# Patient Record
Sex: FEMALE | Race: WHITE | NOT HISPANIC OR LATINO | Employment: FULL TIME | URBAN - METROPOLITAN AREA
[De-identification: names, ages, dates, MRNs, and addresses within clinical notes are randomized per-mention and may not be internally consistent; named-entity substitution may affect disease eponyms.]

---

## 2017-06-08 ENCOUNTER — LAB REQUISITION (OUTPATIENT)
Dept: LAB | Facility: HOSPITAL | Age: 52
End: 2017-06-08
Payer: COMMERCIAL

## 2017-06-08 ENCOUNTER — ALLSCRIPTS OFFICE VISIT (OUTPATIENT)
Dept: OTHER | Facility: OTHER | Age: 52
End: 2017-06-08

## 2017-06-08 DIAGNOSIS — Z11.51 ENCOUNTER FOR SCREENING FOR HUMAN PAPILLOMAVIRUS (HPV): ICD-10-CM

## 2017-06-08 DIAGNOSIS — Z12.31 ENCOUNTER FOR SCREENING MAMMOGRAM FOR MALIGNANT NEOPLASM OF BREAST: ICD-10-CM

## 2017-06-08 DIAGNOSIS — Z12.4 ENCOUNTER FOR SCREENING FOR MALIGNANT NEOPLASM OF CERVIX: ICD-10-CM

## 2017-06-08 PROCEDURE — G0145 SCR C/V CYTO,THINLAYER,RESCR: HCPCS | Performed by: OBSTETRICS & GYNECOLOGY

## 2017-06-08 PROCEDURE — 87624 HPV HI-RISK TYP POOLED RSLT: CPT | Performed by: OBSTETRICS & GYNECOLOGY

## 2017-06-12 LAB — HPV RRNA GENITAL QL NAA+PROBE: NORMAL

## 2017-06-14 LAB
LAB AP GYN PRIMARY INTERPRETATION: NORMAL
Lab: NORMAL

## 2017-11-01 ENCOUNTER — HOSPITAL ENCOUNTER (OUTPATIENT)
Dept: RADIOLOGY | Age: 52
Discharge: HOME | End: 2017-11-01
Payer: COMMERCIAL

## 2017-11-01 DIAGNOSIS — Z12.31 ENCOUNTER FOR SCREENING MAMMOGRAM FOR MALIGNANT NEOPLASM OF BREAST: ICD-10-CM

## 2017-11-01 PROCEDURE — 77063 BREAST TOMOSYNTHESIS BI: CPT

## 2017-11-01 PROCEDURE — G0202 SCR MAMMO BI INCL CAD: HCPCS

## 2017-11-02 ENCOUNTER — GENERIC CONVERSION - ENCOUNTER (OUTPATIENT)
Dept: OTHER | Facility: OTHER | Age: 52
End: 2017-11-02

## 2017-11-02 DIAGNOSIS — R92.2 INCONCLUSIVE MAMMOGRAM: ICD-10-CM

## 2017-11-22 ENCOUNTER — HOSPITAL ENCOUNTER (OUTPATIENT)
Dept: ULTRASOUND IMAGING | Facility: CLINIC | Age: 52
Discharge: HOME | End: 2017-11-22
Payer: COMMERCIAL

## 2017-11-22 DIAGNOSIS — R92.2 INCONCLUSIVE MAMMOGRAM: ICD-10-CM

## 2017-11-22 PROCEDURE — 76641 ULTRASOUND BREAST COMPLETE: CPT

## 2017-11-22 PROCEDURE — 76377 3D RENDER W/INTRP POSTPROCES: CPT

## 2018-01-12 NOTE — MISCELLANEOUS
Message   Recorded as Task   Date: 03/31/2016 02:44 PM, Created By: Yvette Nam   Task Name: Go to Result   Assigned To: Anatoliy Brewer   Regarding Patient: Jayesh Andres, Status: Active   CommentDeneen Mace - 31 Mar 2016 2:44 PM     TASK CREATED  Mammogram is normal but dense  Could consider automated breast ultrasound now or 3-D mammogram next year  Meseret Sherman - 31 Mar 2016 2:51 PM     TASK REPLIED TO: Previously Assigned To [facility]  letter and script sent to pt        Active Problems    1  Breast self examination education, encounter for (V65 49) (Z71 89)   2  Dense breasts (793 82) (R92 2)   3  Encounter for cervical Pap smear with pelvic exam (V76 2,V72 31) (Z01 419)   4  Encounter for routine gynecological examination (V72 31) (Z01 419)   5  Encounter for screening mammogram for malignant neoplasm of breast (V76 12)   (Z12 31)   6  Menopause (627 2)   7  Symptomatic menopausal or female climacteric states (627 2) (N95 1)    Current Meds   1  Climara 0 05 MG/24HR Transdermal Patch Weekly (Estradiol); APPLY 1 PATCH   WEEKLY AS DIRECTED; Therapy: 02Apr2012 to (Evaluate:31Oct2016)  Requested for: [id number]; Last   Rx:30Nov2015 Ordered   2  Levothyroxine Sodium 25 MCG Oral Tablet; Therapy: (Recorded:30Nov2015) to Recorded    Allergies    1  No Known Drug Allergies    Plan  Dense breasts    · US BREAST BILATERAL ABUS; Status:Hold For - Scheduling,Retrospective  Authorization;  Requested for:31Mar2016;     Signatures   Electronically signed by : Yadira Raymond, ; Mar 31 2016  2:52PM EST                       (Author)

## 2018-01-13 VITALS
HEIGHT: 61 IN | BODY MASS INDEX: 23.22 KG/M2 | WEIGHT: 123 LBS | DIASTOLIC BLOOD PRESSURE: 64 MMHG | SYSTOLIC BLOOD PRESSURE: 108 MMHG

## 2018-01-17 NOTE — MISCELLANEOUS
Message   Recorded as Task   Date: 11/02/2017 10:19 AM, Created By: Mone Tripp   Task Name: Miscellaneous   Assigned To: Marconstanza Pérez   Regarding Patient: Mathew Forman, Status: Active   CommentConi Earl - 02 Nov 2017 10:19 AM     TASK CREATED  Mammogram within normal limits with increased breast density noted  Could consider automated breast ultrasound soon and/or 3D mammogram in 1 year's time  Meseret Sherman - 16 Nov 2017 1:33 PM     TASK EDITED  letter and script mailed to pt        Active Problems    1  Breast self examination education, encounter for (V65 49) (Z71 89)   2  Cervical cancer screening (V76 2) (Z12 4)   3  Dense breasts (793 82) (R92 2)   4  Encounter for routine gynecological examination with Papanicolaou smear of cervix   (V72 31,V76 2) (Z01 419)   5  Encounter for screening mammogram for malignant neoplasm of breast (V76 12)   (Z12 31)   6  Menopause (627 2)   7  Screening for HPV (human papillomavirus) (V73 81) (Z11 51)   8  Symptomatic menopausal or female climacteric states (627 2) (N95 1)    Current Meds   1  Climara 0 05 MG/24HR Transdermal Patch Weekly (Estradiol); APPLY 1 PATCH   WEEKLY AS DIRECTED; Therapy: 02Apr2012 to (Evaluate:[id number])  Requested for: [id number]; Last   Rx:08Jun2017 Ordered   2  Levothyroxine Sodium 25 MCG Oral Tablet; Therapy: (Recorded:30Nov2015) to Recorded    Allergies    1  No Known Drug Allergies    Plan  Dense breasts    · US BREAST SCREENING BILATERAL COMPLETE (ABUS); Status:Hold For -  [facility];  Requested KMU:[id number];     Signatures   Electronically signed by : Mala Mancilla, ; Nov 2 2017  1:33PM EST                       (Author)

## 2018-06-20 ENCOUNTER — ANNUAL EXAM (OUTPATIENT)
Dept: OBGYN CLINIC | Facility: CLINIC | Age: 53
End: 2018-06-20
Payer: COMMERCIAL

## 2018-06-20 VITALS
BODY MASS INDEX: 23.9 KG/M2 | WEIGHT: 126.6 LBS | DIASTOLIC BLOOD PRESSURE: 60 MMHG | SYSTOLIC BLOOD PRESSURE: 112 MMHG | HEIGHT: 61 IN

## 2018-06-20 DIAGNOSIS — N95.1 SYMPTOMATIC MENOPAUSAL OR FEMALE CLIMACTERIC STATES: ICD-10-CM

## 2018-06-20 DIAGNOSIS — R92.2 DENSE BREASTS: ICD-10-CM

## 2018-06-20 DIAGNOSIS — Z01.419 WOMEN'S ANNUAL ROUTINE GYNECOLOGICAL EXAMINATION: ICD-10-CM

## 2018-06-20 DIAGNOSIS — Z12.4 SCREENING FOR CERVICAL CANCER: Primary | ICD-10-CM

## 2018-06-20 DIAGNOSIS — Z12.31 VISIT FOR SCREENING MAMMOGRAM: ICD-10-CM

## 2018-06-20 PROCEDURE — G0145 SCR C/V CYTO,THINLAYER,RESCR: HCPCS | Performed by: OBSTETRICS & GYNECOLOGY

## 2018-06-20 PROCEDURE — 99396 PREV VISIT EST AGE 40-64: CPT | Performed by: OBSTETRICS & GYNECOLOGY

## 2018-06-20 RX ORDER — FLUTICASONE PROPIONATE 50 MCG
2 SPRAY, SUSPENSION (ML) NASAL
COMMUNITY
Start: 2017-09-06 | End: 2021-08-12

## 2018-06-20 RX ORDER — LEVOTHYROXINE SODIUM 0.03 MG/1
TABLET ORAL
COMMUNITY

## 2018-06-20 RX ORDER — ALBUTEROL SULFATE 90 UG/1
1 AEROSOL, METERED RESPIRATORY (INHALATION) EVERY 6 HOURS
COMMUNITY
Start: 2017-10-06 | End: 2018-10-06

## 2018-06-20 RX ORDER — ESTRADIOL 0.05 MG/D
1 PATCH TRANSDERMAL WEEKLY
COMMUNITY
Start: 2012-04-02 | End: 2018-06-20 | Stop reason: SDUPTHER

## 2018-06-20 RX ORDER — ESTRADIOL 0.05 MG/D
1 PATCH TRANSDERMAL WEEKLY
Qty: 12 PATCH | Refills: 3 | Status: SHIPPED | OUTPATIENT
Start: 2018-06-20 | End: 2020-05-11 | Stop reason: SDUPTHER

## 2018-06-20 NOTE — PATIENT INSTRUCTIONS
Hormone Replacement Therapy in Women   WHAT YOU NEED TO KNOW:   What is hormone replacement therapy? Hormone replacement therapy (HRT) is medicine to replace your low hormone levels  HRT is most common for women who are going through menopause  HRT contains estrogen and sometimes progestin  What are hormones and how do they work? Hormones are chemicals that your body makes to control certain body functions  The main female hormones are estrogen and progesterone  These are made by your ovaries  These hormones are an important part of your reproductive system  What are the signs and symptoms of low hormone levels? · Hot flashes    · Sleep loss    · Depression, nervousness, or tiredness    · Trouble staying focused    · Vaginal dryness  Why might I have low hormone levels? · Menopause  is the most common reason for low hormone levels  The average age when a woman's monthly period stops is 51 years  · Removal of your ovaries  Menopause symptoms start right away after ovaries are removed  This happens because there is no more estrogen in your body  · Excessive exercise or weight loss  can cause your estrogen level to decrease  Your monthly periods may also stop  Who should not take HRT? You should not take HRT if you have any of the following conditions:  · Breast cancer    · Cancer in the lining of your uterus or cancer of your ovaries    · Heart disease    · High blood pressure    · History of blood clots    · History of smoking, or current tobacco use    · Liver disease    · Unexplained vaginal bleeding  What else should I know about HRT? HRT helps prevent osteoporosis, which decreases your risk for bone fractures  HRT also protects you from colorectal cancer  HRT may increase your risk for breast cancer, blood clots, heart disease, and stroke  How often should I follow up with my healthcare provider? You will need to return to see your healthcare provider at least once a year   You may need tests, such a pap smear or mammogram  Your healthcare provider will ask how you are responding to HRT, and change your dose if needed  When should I contact my healthcare provider? · You have nausea or are vomiting  · You are depressed  · You have abdominal pain or cramping  · You have swollen or tender breasts  · You have more vaginal bleeding than expected  · You have sudden weight gain or loss  · You have questions or concerns about your condition or care  When should I seek immediate care or call 911? · You are confused  · Your arm or leg feels warm, tender, and painful  It may look swollen and red  · You feel lightheaded, short of breath, and have chest pain  · You cough up blood  · You have any of the following signs of a stroke:      ¨ Numbness or drooping on one side of your face     ¨ Weakness in an arm or leg    ¨ Confusion or difficulty speaking    ¨ Dizziness, a severe headache, or vision loss  CARE AGREEMENT:   You have the right to help plan your care  Learn about your health condition and how it may be treated  Discuss treatment options with your caregivers to decide what care you want to receive  You always have the right to refuse treatment  The above information is an  only  It is not intended as medical advice for individual conditions or treatments  Talk to your doctor, nurse or pharmacist before following any medical regimen to see if it is safe and effective for you  © 2017 [facility] Information is for End User's use only and may not be sold, redistributed or otherwise used for commercial purposes  All illustrations and images included in CareNotes® are the copyrighted property of [employer] M [employer]  or [employer]

## 2018-06-20 NOTE — PROGRESS NOTES
Assessment/Plan:  1  Yearly exam-Pap smear done with reflex HPV at patient request, self-breast awareness reviewed, calcium/vitamin-D recommendations discussed, mammogram request given, colonoscopy as per specialist   2   Symptomatic menopause-patient continues on Climara 0 05 mg patch  She has been on this for 14 years now  Instead of using at 7 day intervals, she uses it up to 30 day intervals in attempts to wean off the medication  She requested prescription and 3 month supply refill 3 was sent to mail order pharmacy at her request   She is aware of risks of hormone replacement therapy as discussed previously  3   Status post supracervical hysterectomy with bilateral salpingo-oophorectomy  4  Increased breast density-noted on most recent mammogram 11/1/17 and previously  ABUS was done 11/22/18 and was negative  Patient will continue with 3D mammogram and request was given postdated 11/1/18  She will follow-up in 1 year or as needed  No problem-specific Assessment & Plan notes found for this encounter  Diagnoses and all orders for this visit:    Screening for cervical cancer  -     Liquid-based pap, screening    Women's annual routine gynecological examination    Visit for screening mammogram  -     Mammo screening bilateral w 3d & cad; Future    Symptomatic menopausal or female climacteric states  -     estradiol (CLIMARA) 0 05 mg/24 hr; Place 1 patch on the skin once a week    Other orders  -     Discontinue: estradiol (CLIMARA) 0 05 mg/24 hr; Place 1 patch on the skin once a week  -     levothyroxine 25 mcg tablet; Take by mouth  -     albuterol (PROVENTIL HFA,VENTOLIN HFA) 90 mcg/act inhaler; Inhale 1 puff every 6 (six) hours  -     fluticasone (FLONASE) 50 mcg/act nasal spray; 2 sprays into each nostril          Subjective:      Patient ID: Leanne Greenberg is a 46 y o  female  Patient was seen today for yearly exam   Please see assessment plan for details          The following portions of the patient's history were reviewed and updated as appropriate: allergies, current medications, past family history, past medical history, past social history, past surgical history and problem list     Review of Systems   Constitutional: Negative for chills, diaphoresis, fatigue and fever  Respiratory: Negative for apnea, cough, chest tightness, shortness of breath and wheezing  Cardiovascular: Negative for chest pain, palpitations and leg swelling  Gastrointestinal: Negative for abdominal distention, abdominal pain, anal bleeding, constipation, diarrhea, nausea, rectal pain and vomiting  Genitourinary: Negative for difficulty urinating, dyspareunia, dysuria, frequency, hematuria, menstrual problem, pelvic pain, urgency, vaginal bleeding, vaginal discharge and vaginal pain  Musculoskeletal: Negative for arthralgias, back pain and myalgias  Skin: Negative for color change and rash  Neurological: Negative for dizziness, syncope, light-headedness, numbness and headaches  Hematological: Negative for adenopathy  Does not bruise/bleed easily  Psychiatric/Behavioral: Negative for dysphoric mood and sleep disturbance  The patient is not nervous/anxious  Occasional menopausal symptoms     Objective:      /60 (BP Location: Left arm, Patient Position: Sitting, Cuff Size: Standard)   Ht 5' 0 5" (1 537 m)   Wt 57 4 kg (126 lb 9 6 oz)   BMI 24 32 kg/m²          Physical Exam    Objective      /60 (BP Location: Left arm, Patient Position: Sitting, Cuff Size: Standard)   Ht 5' 0 5" (1 537 m)   Wt 57 4 kg (126 lb 9 6 oz)   BMI 24 32 kg/m²     General:   alert and oriented, in no acute distress, alert, appears stated age and cooperative   Neck: normal to inspection and palpation   Breast: normal appearance, no masses or tenderness   Heart:    Lungs:    Abdomen: soft, non-tender, without masses or organomegaly   Vulva: normal   Vagina: Without erythema or lesions or discharge   Very minimally atrophic   Cervix: Without lesions or discharge or cervicitis  No Cervical motion tenderness   and normal   Uterus: surgically absent   Adnexa: no mass, fullness, tenderness   Rectum: negative

## 2018-06-25 LAB
LAB AP GYN PRIMARY INTERPRETATION: NORMAL
Lab: NORMAL

## 2018-11-12 ENCOUNTER — HOSPITAL ENCOUNTER (OUTPATIENT)
Dept: RADIOLOGY | Age: 53
Discharge: HOME | End: 2018-11-12
Payer: COMMERCIAL

## 2018-11-12 VITALS — WEIGHT: 124 LBS | HEIGHT: 61 IN | BODY MASS INDEX: 23.41 KG/M2

## 2018-11-12 DIAGNOSIS — Z12.31 VISIT FOR SCREENING MAMMOGRAM: ICD-10-CM

## 2018-11-12 PROCEDURE — 77063 BREAST TOMOSYNTHESIS BI: CPT

## 2018-11-12 PROCEDURE — 77067 SCR MAMMO BI INCL CAD: CPT

## 2018-11-14 ENCOUNTER — TELEPHONE (OUTPATIENT)
Dept: OBGYN CLINIC | Facility: CLINIC | Age: 53
End: 2018-11-14

## 2018-11-14 DIAGNOSIS — R92.2 DENSE BREAST TISSUE: Primary | ICD-10-CM

## 2018-11-14 NOTE — TELEPHONE ENCOUNTER
----- Message from Lawanda Day MD sent at 11/13/2018 12:41 PM EST -----  3D Mammogram within normal limits with increased breast density noted  Could consider automated breast ultrasound soon and/or 3D mammogram in 1 year's time

## 2018-11-30 ENCOUNTER — TELEPHONE (OUTPATIENT)
Dept: OBGYN CLINIC | Facility: CLINIC | Age: 53
End: 2018-11-30

## 2018-11-30 ENCOUNTER — HOSPITAL ENCOUNTER (OUTPATIENT)
Dept: ULTRASOUND IMAGING | Facility: CLINIC | Age: 53
Discharge: HOME | End: 2018-11-30

## 2018-12-10 ENCOUNTER — HOSPITAL ENCOUNTER (OUTPATIENT)
Dept: ULTRASOUND IMAGING | Facility: CLINIC | Age: 53
Discharge: HOME | End: 2018-12-10
Payer: COMMERCIAL

## 2018-12-10 DIAGNOSIS — R92.2 DENSE BREAST TISSUE: ICD-10-CM

## 2018-12-10 PROCEDURE — 76641 ULTRASOUND BREAST COMPLETE: CPT

## 2018-12-10 PROCEDURE — 76377 3D RENDER W/INTRP POSTPROCES: CPT

## 2019-06-24 ENCOUNTER — ANNUAL EXAM (OUTPATIENT)
Dept: OBGYN CLINIC | Facility: CLINIC | Age: 54
End: 2019-06-24
Payer: COMMERCIAL

## 2019-06-24 VITALS
SYSTOLIC BLOOD PRESSURE: 108 MMHG | DIASTOLIC BLOOD PRESSURE: 62 MMHG | HEIGHT: 61 IN | BODY MASS INDEX: 24.05 KG/M2 | WEIGHT: 127.4 LBS

## 2019-06-24 DIAGNOSIS — Z13.820 OSTEOPOROSIS SCREENING: ICD-10-CM

## 2019-06-24 DIAGNOSIS — Z12.4 SCREENING FOR CERVICAL CANCER: Primary | ICD-10-CM

## 2019-06-24 DIAGNOSIS — Z12.31 ENCOUNTER FOR SCREENING MAMMOGRAM FOR MALIGNANT NEOPLASM OF BREAST: ICD-10-CM

## 2019-06-24 DIAGNOSIS — Z01.419 WOMEN'S ANNUAL ROUTINE GYNECOLOGICAL EXAMINATION: ICD-10-CM

## 2019-06-24 DIAGNOSIS — R92.2 DENSE BREASTS: ICD-10-CM

## 2019-06-24 PROCEDURE — 99396 PREV VISIT EST AGE 40-64: CPT | Performed by: OBSTETRICS & GYNECOLOGY

## 2019-06-24 PROCEDURE — G0145 SCR C/V CYTO,THINLAYER,RESCR: HCPCS | Performed by: OBSTETRICS & GYNECOLOGY

## 2019-06-26 LAB
LAB AP GYN PRIMARY INTERPRETATION: NORMAL
Lab: NORMAL

## 2019-07-08 ENCOUNTER — TELEPHONE (OUTPATIENT)
Dept: OBGYN CLINIC | Facility: CLINIC | Age: 54
End: 2019-07-08

## 2019-08-23 ENCOUNTER — HOSPITAL ENCOUNTER (OUTPATIENT)
Dept: RADIOLOGY | Age: 54
Discharge: HOME | End: 2019-08-23
Payer: COMMERCIAL

## 2019-08-23 VITALS — WEIGHT: 127 LBS | BODY MASS INDEX: 23.98 KG/M2 | HEIGHT: 61 IN

## 2019-08-23 DIAGNOSIS — Z12.31 ENCOUNTER FOR SCREENING MAMMOGRAM FOR MALIGNANT NEOPLASM OF BREAST: ICD-10-CM

## 2019-08-23 DIAGNOSIS — Z13.820 OSTEOPOROSIS SCREENING: ICD-10-CM

## 2019-08-23 PROBLEM — M85.80 OSTEOPENIA: Status: ACTIVE | Noted: 2019-08-23

## 2019-08-23 PROCEDURE — 77080 DXA BONE DENSITY AXIAL: CPT

## 2019-08-23 PROCEDURE — 77063 BREAST TOMOSYNTHESIS BI: CPT

## 2019-08-23 PROCEDURE — 77067 SCR MAMMO BI INCL CAD: CPT

## 2019-08-26 ENCOUNTER — TELEPHONE (OUTPATIENT)
Dept: OBGYN CLINIC | Facility: CLINIC | Age: 54
End: 2019-08-26

## 2019-08-26 DIAGNOSIS — R92.2 DENSE BREAST TISSUE: Primary | ICD-10-CM

## 2019-08-26 NOTE — TELEPHONE ENCOUNTER
----- Message from Hayde Valdez MD sent at 8/23/2019  4:14 PM EDT -----  DEXA scan is consistent with osteopenia, with FRAX risk below that recommended for medical treatment  Would recommend calcium, vitamin-D, and weight-bearing exercise  Would recommend repeat DEXA scan in about 2 years time  Please inform the patient    Thanks

## 2019-08-26 NOTE — TELEPHONE ENCOUNTER
----- Message from Eliane Ma MD sent at 8/23/2019  4:17 PM EDT -----  3D Mammogram within normal limits with increased breast density noted  Could consider automated breast ultrasound soon and/or 3D mammogram in 1 year's time

## 2019-10-01 ENCOUNTER — TELEPHONE (OUTPATIENT)
Dept: OBGYN CLINIC | Facility: CLINIC | Age: 54
End: 2019-10-01

## 2019-10-01 NOTE — TELEPHONE ENCOUNTER
Patient called to report was turned down for ABUS appointment because she is c/o bilateral breast pain since last weekend  Advised breast exam   Scheduled for 10/3/19  Patient only wants Dr Gwynne Cheadle

## 2019-10-03 ENCOUNTER — OFFICE VISIT (OUTPATIENT)
Dept: OBGYN CLINIC | Facility: CLINIC | Age: 54
End: 2019-10-03
Payer: COMMERCIAL

## 2019-10-03 VITALS
DIASTOLIC BLOOD PRESSURE: 78 MMHG | WEIGHT: 128.8 LBS | BODY MASS INDEX: 24.32 KG/M2 | SYSTOLIC BLOOD PRESSURE: 116 MMHG | HEIGHT: 61 IN

## 2019-10-03 DIAGNOSIS — R92.2 DENSE BREASTS: Primary | ICD-10-CM

## 2019-10-03 DIAGNOSIS — M85.80 OSTEOPENIA, UNSPECIFIED LOCATION: ICD-10-CM

## 2019-10-03 DIAGNOSIS — N95.1 SYMPTOMATIC MENOPAUSAL OR FEMALE CLIMACTERIC STATES: ICD-10-CM

## 2019-10-03 DIAGNOSIS — N64.4 BREAST PAIN: ICD-10-CM

## 2019-10-03 PROCEDURE — 99214 OFFICE O/P EST MOD 30 MIN: CPT | Performed by: OBSTETRICS & GYNECOLOGY

## 2019-10-03 NOTE — PATIENT INSTRUCTIONS
Menopause   WHAT YOU NEED TO KNOW:   What is menopause? Menopause is a normal stage in a woman's life when her monthly periods stop  Menopause starts when the ovaries slowly stop making the female hormones estrogen and progesterone  After menopause, a woman is no longer able to become pregnant  A woman who has not had a period for a full year after the age of 39 is considered to be in menopause  Perimenopause is a stage before menopause that may cause signs and symptoms similar to menopause  Perimenopause can last an average of 4 to 5 years  What are the signs and symptoms of menopause? The signs and symptoms of menopause can be different from woman to woman:  · Menstrual period changes such as skipped periods or periods that are closer together, or lighter or heavier than usual    · Hot flashes (feeling warm, flushed, and sweaty)     · Mood changes such as irritability or decreased desire to have sex    · Breast changes such as tenderness or pain    · Hair changes such as thinning hair or increased hair on your face    · Vaginal changes such as increased dryness     · Urinary changes such as increased urinary tract infections (UTIs) or urgency (feeling that you need to urinate right away)    · Other symptoms such as headaches, trouble sleeping, fatigue, or heart palpitations (strong, fast heartbeats)  What do I need to know about menopause? · You can still get pregnant while you have periods  Continue to use birth control if you do not want to get pregnant  You may need to use birth control until it has been 1 year since your periods stopped  Ask your healthcare provider when you can stop using birth control to prevent pregnancy  · Hormone replacement therapy can be used to treat symptoms of menopause  Hormone replacement therapy (HRT) is medicine that replaces your low hormone levels  HRT contains estrogen and sometimes progestin  HRT has benefits and risks   HRT decreases your risk for bone fractures by helping to prevent osteoporosis  HRT also protects you from colon cancer  HRT may increase your risk for breast cancer, blood clots, heart disease, and stroke  Ask your healthcare provider if HRT is right for you  How can I live a healthy lifestyle during and after menopause? After menopause, your risk for heart disease and bone loss increases  Ask about these and other ways to stay healthy:  · Exercise regularly  Exercise helps you maintain a healthy weight  Exercise can also help to control your blood pressure and cholesterol levels  Include weight-bearing exercise for strong bones  Ask your healthcare provider about the best exercise plan for you  · Eat a variety of healthy foods  Include fruits, vegetables, whole grains (whole-wheat bread, pasta, and cereals), low-fat dairy, and lean protein foods (beans, poultry, and fish)  Limit foods high in sodium (salt)  Ask your healthcare provider for more information about a meal plan that is right for you  · Maintain a healthy weight  Check with your healthcare provider before you start any weight loss program      · Take supplements as directed  You may need extra calcium and vitamin D to help prevent osteoporosis  · Limit alcohol and caffeine  Alcohol and caffeine may worsen your symptoms  · Do not smoke  If you smoke, it is never too late to quit  You are more likely to have a heart attack, lung disease, blood clots, and cancer if you smoke  Ask your healthcare provider for information if you need help quitting  When should I contact my healthcare provider? · You have vaginal bleeding after menopause  · You have questions or concerns about your condition or care  CARE AGREEMENT:   You have the right to help plan your care  Learn about your health condition and how it may be treated  Discuss treatment options with your caregivers to decide what care you want to receive  You always have the right to refuse treatment   The above information is an  only  It is not intended as medical advice for individual conditions or treatments  Talk to your doctor, nurse or pharmacist before following any medical regimen to see if it is safe and effective for you  © 2017 [employer] Information is for End User's use only and may not be sold, redistributed or otherwise used for commercial purposes  All illustrations and images included in CareNotes® are the copyrighted property of [employer]  or [employer]   WHAT YOU NEED TO KNOW:   What is osteopenia? Osteopenia is a condition that causes bone loss and low bone mineral density (BMD)  Low BMD can weaken your bones and increase your risk for fractures  Osteopenia does not cause signs or symptoms  You may not know you have it until you break a bone  Osteopenia must be managed or treated so it does not worsen and become osteoporosis  Osteoporosis is a serious condition that causes bones to become weak, brittle, and easily fractured  What increases my risk for osteopenia? Your risk increases as you age and lose bone  The following may also increase your risk:  · Lack of weight-bearing exercise, or being bedridden    · Loss of testosterone (in men), or loss of estrogen, such as from menopause (in women)    · Family history of osteopenia or osteoporosis    · Alcohol abuse or smoking cigarettes    · Long-term use of a steroid medicine or an anticonvulsant medicine    · An eating disorder, such as anorexia    · Not enough calcium and vitamin D  How is osteopenia diagnosed and treated? · A bone scan  may be used to measure your bone density (thickness) and bone mass (amount)  You may need a bone scan if you are older than 65 years or you are in menopause  A bone scan may be used if you are younger than 72 years and at increased risk for fractures   A dual-energy x-ray absorptiometry (DXA) is a type of bone scan that measures the mineral content in your spine, hip, or forearm  DXA will give a number, called a T-score, based on how much bone mineral you have  The T-score shows your risk for fracture  · Treatment  depends on your risk for fracture  If your risk is low, you may only need to make exercise, nutrition, and other lifestyle changes to manage osteopenia  The changes can help prevent more bone mineral loss and reduce your risk for fractures  If your risk is high, you may be given medicines to help strengthen your bones, prevent bone breakdown, or increase bone formation  What can I do to manage or prevent osteopenia? · Exercise as directed  Do weight-bearing exercises, such as brisk walking, dancing, or yoga  Weight-bearing exercises help build or maintain bone  Exercises such as swimming and bike riding are non-weight-bearing and will not build or maintain bone  Weightlifting also helps strengthen bones and build muscle  Extra muscle can help protect your bones  Your healthcare provider may recommend weightlifting 3 times per week as part of your exercise routine  · Increase calcium and vitamin D as directed  Calcium and vitamin D work together to help build bone  The body deposits calcium into the bones until about age 27  You will then need to get enough calcium and vitamin D to maintain what your bones are storing  Your healthcare provider may tell you to eat more dairy products, such as milk and cheese, for calcium  Spinach, salmon, and dried beans are also good sources of calcium  Cereal, bread, and orange juice may be fortified with vitamin D  You also get vitamin D from exposure to sunlight  Your healthcare provider may also suggest a calcium or vitamin D supplement  Do not take supplements unless directed  · Limit or do not drink alcohol as directed  Alcohol can take calcium from your bones and increase your risk for fractures  Ask your healthcare provider if it is safe for you to drink alcohol   Women should limit alcohol to 1 drink per day  Men should limit alcohol to 2 drinks per day  A drink of alcohol is 12 ounces of beer, 5 ounces of wine, or 1½ ounces of liquor  · Do not smoke  Nicotine can damage blood vessels and make it more difficult to manage your osteopenia  Smoking also affects bone density and increases your risk for bone fractures  Do not use e-cigarettes or smokeless tobacco in place of cigarettes or to help you quit  They still contain nicotine  Ask your healthcare provider for information if you currently smoke and need help quitting  Ask your healthcare provider for information if you need help quitting  · Prevent falls  Decrease your risk for falling by removing items from the floor and removing loose carpets  Turn the lights on where you will be walking  CARE AGREEMENT:   You have the right to help plan your care  Learn about your health condition and how it may be treated  Discuss treatment options with your caregivers to decide what care you want to receive  You always have the right to refuse treatment  The above information is an  only  It is not intended as medical advice for individual conditions or treatments  Talk to your doctor, nurse or pharmacist before following any medical regimen to see if it is safe and effective for you  © 2017 [employer] Information is for End User's use only and may not be sold, redistributed or otherwise used for commercial purposes  All illustrations and images included in CareNotes® are the copyrighted property of [employer]  or Anthony Irizarry

## 2019-10-03 NOTE — PROGRESS NOTES
Assessment/Plan   Diagnoses and all orders for this visit:    Dense breasts    Breast pain  -     US BREAST BILATERAL LIMITED (DIAGNOSTIC); Future     1  Bilateral breast pain-patient with long history of breast discomfort  About 1 week ago, she had left-sided breast tenderness  This resolved after about 4 days or so  She then had right breast discomfort lasting a few days time with recent resolution  Breast exam is notable for fibrocystic changes without any dominant masses or lesions or discharge or warmth or erythema noted  Well-healed scars are noted from prior breast surgery  She had recent 3D mammogram demonstrating dense breast tissue with Tyrer [place] risk of 11 5%  Patient was counseled about this  She was scheduled to have ABUS, but was appropriately told that with breast pain she should consider other imaging  She will have bilateral diagnostic breast ultrasound for evaluation  We will hold on any diagnostic mammogram at this time  Disposition will be based on findings  Practical recommendations were reviewed  She will call or return with any symptoms as documented above  2  Menopause-patient continues on Climara patch  Generally, she changes the patch at about 20 day intervals  We had reviewed previously about trying to get off the hormones  3  Status post supracervical hysterectomy with bilateral salpingo-oophorectomy-done around age 42  3  Dense breast tissue-follow-up imaging as noted above  5  Osteopenia-noted on most recent DEXA scan from August 2019  Risk was well below that recommended for medical treatment  She will continue with calcium, vitamin-D, and weight-bearing exercise  She is taking extra calcium as her primary doctor said her calcium level was quite low  She will follow-up with this doctor as well  To follow-up June 2020 for yearly exam or as needed  Subjective   Patient ID: Jeff Ann is a 53 y o  female      Vitals:    10/03/19 1335   BP: 116/78 Patient was seen today for follow-up visit  Please see assessment plan for details        The following portions of the patient's history were reviewed and updated as appropriate: allergies, current medications, past family history, past medical history, past social history, past surgical history and problem list   Past Medical History:   Diagnosis Date    Disease of thyroid gland     Endometriosis      Past Surgical History:   Procedure Laterality Date    REDUCTION MAMMAPLASTY Bilateral     TOTAL ABDOMINAL HYSTERECTOMY      age 38   Bijal Diallo TOTAL ABDOMINAL HYSTERECTOMY W/ BILATERAL SALPINGOOPHORECTOMY Bilateral     age 38     OB History    Para Term  AB Living   2 2 2         SAB TAB Ectopic Multiple Live Births                  # Outcome Date GA Lbr Cristo/2nd Weight Sex Delivery Anes PTL Lv   2 Term            1 Term                Current Outpatient Medications:     estradiol (CLIMARA) 0 05 mg/24 hr, Place 1 patch on the skin once a week, Disp: 12 patch, Rfl: 3    levothyroxine 25 mcg tablet, Take by mouth, Disp: , Rfl:     fluticasone (FLONASE) 50 mcg/act nasal spray, 2 sprays into each nostril, Disp: , Rfl:   No Known Allergies  Social History     Socioeconomic History    Marital status: /Civil Union     Spouse name: None    Number of children: None    Years of education: None    Highest education level: None   Occupational History    None   Social Needs    Financial resource strain: None    Food insecurity:     Worry: None     Inability: None    Transportation needs:     Medical: None     Non-medical: None   Tobacco Use    Smoking status: Never Smoker    Smokeless tobacco: Never Used   Substance and Sexual Activity    Alcohol use: Yes     Comment: SOCIAL     Drug use: No    Sexual activity: None   Lifestyle    Physical activity:     Days per week: None     Minutes per session: None    Stress: None   Relationships    Social connections:     Talks on phone: None Gets together: None     Attends Druze service: None     Active member of club or organization: None     Attends meetings of clubs or organizations: None     Relationship status: None    Intimate partner violence:     Fear of current or ex partner: None     Emotionally abused: None     Physically abused: None     Forced sexual activity: None   Other Topics Concern    None   Social History Narrative    None     Family History   Problem Relation Age of Onset    Breast cancer Mother 70    No Known Problems Father     No Known Problems Sister     No Known Problems Maternal Grandmother     No Known Problems Maternal Grandfather     No Known Problems Paternal Grandmother     No Known Problems Paternal Grandfather     No Known Problems Sister     No Known Problems Son     No Known Problems Son        Review of Systems   Constitutional: Negative for chills, diaphoresis, fatigue and fever  Respiratory: Negative for apnea, cough, chest tightness, shortness of breath and wheezing  Cardiovascular: Negative for chest pain, palpitations and leg swelling  Gastrointestinal: Negative for abdominal distention, abdominal pain, anal bleeding, constipation, diarrhea, nausea, rectal pain and vomiting  Genitourinary: Negative for difficulty urinating, dyspareunia, dysuria, frequency, hematuria, menstrual problem, pelvic pain, urgency, vaginal bleeding, vaginal discharge and vaginal pain  Musculoskeletal: Negative for arthralgias, back pain and myalgias  Skin: Negative for color change and rash  Neurological: Negative for dizziness, syncope, light-headedness, numbness and headaches  Hematological: Negative for adenopathy  Does not bruise/bleed easily  Psychiatric/Behavioral: Negative for dysphoric mood and sleep disturbance  The patient is not nervous/anxious       Breast pain    Objective   Physical Exam    Objective      /78 (BP Location: Left arm, Patient Position: Sitting, Cuff Size: Large) Ht 5' 1" (1 549 m)   Wt 58 4 kg (128 lb 12 8 oz)   BMI 24 34 kg/m²     General:   alert and oriented, in no acute distress   Neck: normal to inspection and palpation   Breast: Symmetric, with well-healed incision sites  No masses or lesions or discharge or tenderness noted today     Heart:    Lungs:    Abdomen: soft, non-tender, without masses or organomegaly   Vulva:    Vagina:    Cervix:    Uterus:    Adnexa:    Rectum:     Psych:  Normal mood and affect   Skin:  Without obvious lesions   Eyes: symmetric, with normal movements and reactivity   Musculoskeletal:  Normal muscle tone and movements appreciated

## 2019-10-14 ENCOUNTER — TRANSCRIBE ORDERS (OUTPATIENT)
Dept: ADMINISTRATIVE | Facility: HOSPITAL | Age: 54
End: 2019-10-14

## 2019-10-14 ENCOUNTER — HOSPITAL ENCOUNTER (OUTPATIENT)
Dept: ULTRASOUND IMAGING | Facility: CLINIC | Age: 54
Discharge: HOME | End: 2019-10-14
Payer: COMMERCIAL

## 2019-10-14 VITALS — WEIGHT: 128 LBS | BODY MASS INDEX: 24.17 KG/M2 | HEIGHT: 61 IN

## 2019-10-14 DIAGNOSIS — N64.4 BREAST PAIN: ICD-10-CM

## 2019-10-14 DIAGNOSIS — Z12.31 SCREENING MAMMOGRAM, ENCOUNTER FOR: Primary | ICD-10-CM

## 2019-10-14 DIAGNOSIS — R92.2 DENSE BREAST TISSUE: Primary | ICD-10-CM

## 2019-10-14 PROCEDURE — 76642 ULTRASOUND BREAST LIMITED: CPT

## 2019-12-20 ENCOUNTER — TELEPHONE (OUTPATIENT)
Dept: OBGYN CLINIC | Facility: CLINIC | Age: 54
End: 2019-12-20

## 2019-12-31 ENCOUNTER — HOSPITAL ENCOUNTER (OUTPATIENT)
Dept: ULTRASOUND IMAGING | Facility: CLINIC | Age: 54
Discharge: HOME | End: 2019-12-31
Payer: COMMERCIAL

## 2019-12-31 VITALS — WEIGHT: 126 LBS | BODY MASS INDEX: 23.79 KG/M2 | HEIGHT: 61 IN

## 2019-12-31 DIAGNOSIS — R92.2 DENSE BREAST TISSUE: ICD-10-CM

## 2019-12-31 PROCEDURE — 76377 3D RENDER W/INTRP POSTPROCES: CPT

## 2019-12-31 PROCEDURE — 76641 ULTRASOUND BREAST COMPLETE: CPT

## 2020-05-11 DIAGNOSIS — N95.1 SYMPTOMATIC MENOPAUSAL OR FEMALE CLIMACTERIC STATES: ICD-10-CM

## 2020-05-11 RX ORDER — ESTRADIOL 0.05 MG/D
1 PATCH TRANSDERMAL WEEKLY
Qty: 12 PATCH | Refills: 0 | Status: SHIPPED | OUTPATIENT
Start: 2020-05-11 | End: 2020-07-27 | Stop reason: SDUPTHER

## 2020-06-08 ENCOUNTER — TELEPHONE (OUTPATIENT)
Dept: OBGYN CLINIC | Facility: CLINIC | Age: 55
End: 2020-06-08

## 2020-07-27 ENCOUNTER — ANNUAL EXAM (OUTPATIENT)
Dept: OBGYN CLINIC | Facility: CLINIC | Age: 55
End: 2020-07-27
Payer: COMMERCIAL

## 2020-07-27 VITALS
SYSTOLIC BLOOD PRESSURE: 112 MMHG | HEIGHT: 61 IN | DIASTOLIC BLOOD PRESSURE: 66 MMHG | TEMPERATURE: 98.9 F | BODY MASS INDEX: 23.79 KG/M2 | WEIGHT: 126 LBS

## 2020-07-27 DIAGNOSIS — Z01.419 WOMEN'S ANNUAL ROUTINE GYNECOLOGICAL EXAMINATION: Primary | ICD-10-CM

## 2020-07-27 DIAGNOSIS — R92.2 DENSE BREASTS: ICD-10-CM

## 2020-07-27 DIAGNOSIS — Z11.51 SCREENING FOR HPV (HUMAN PAPILLOMAVIRUS): ICD-10-CM

## 2020-07-27 DIAGNOSIS — Z12.4 SCREENING FOR CERVICAL CANCER: ICD-10-CM

## 2020-07-27 DIAGNOSIS — N95.1 SYMPTOMATIC MENOPAUSAL OR FEMALE CLIMACTERIC STATES: ICD-10-CM

## 2020-07-27 PROCEDURE — 87624 HPV HI-RISK TYP POOLED RSLT: CPT | Performed by: OBSTETRICS & GYNECOLOGY

## 2020-07-27 PROCEDURE — G0145 SCR C/V CYTO,THINLAYER,RESCR: HCPCS | Performed by: OBSTETRICS & GYNECOLOGY

## 2020-07-27 PROCEDURE — 99396 PREV VISIT EST AGE 40-64: CPT | Performed by: OBSTETRICS & GYNECOLOGY

## 2020-07-27 RX ORDER — ESTRADIOL 0.05 MG/D
1 PATCH TRANSDERMAL WEEKLY
Qty: 12 PATCH | Refills: 3 | Status: SHIPPED | OUTPATIENT
Start: 2020-07-27 | End: 2021-08-12 | Stop reason: ALTCHOICE

## 2020-07-27 NOTE — PATIENT INSTRUCTIONS
Menopause   WHAT YOU NEED TO KNOW:   What is menopause? Menopause is a normal stage in a woman's life when her monthly periods stop  Menopause starts when the ovaries slowly stop making the female hormones estrogen and progesterone  After menopause, a woman is no longer able to become pregnant  A woman who has not had a period for a full year after the age of 39 is considered to be in menopause  Perimenopause is a stage before menopause that may cause signs and symptoms similar to menopause  Perimenopause can last an average of 4 to 5 years  What are the signs and symptoms of menopause? The signs and symptoms of menopause can be different from woman to woman:  · Menstrual period changes such as skipped periods or periods that are closer together, or lighter or heavier than usual    · Hot flashes (feeling warm, flushed, and sweaty)     · Mood changes such as irritability or decreased desire to have sex    · Breast changes such as tenderness or pain    · Hair changes such as thinning hair or increased hair on your face    · Vaginal changes such as increased dryness     · Urinary changes such as increased urinary tract infections (UTIs) or urgency (feeling that you need to urinate right away)    · Other symptoms such as headaches, trouble sleeping, fatigue, or heart palpitations (strong, fast heartbeats)  What do I need to know about menopause? · You can still get pregnant while you have periods  Continue to use birth control if you do not want to get pregnant  You may need to use birth control until it has been 1 year since your periods stopped  Ask your healthcare provider when you can stop using birth control to prevent pregnancy  · Hormone replacement therapy can be used to treat symptoms of menopause  Hormone replacement therapy (HRT) is medicine that replaces your low hormone levels  HRT contains estrogen and sometimes progestin  HRT has benefits and risks   HRT decreases your risk for bone fractures by helping to prevent osteoporosis  HRT also protects you from colon cancer  HRT may increase your risk for breast cancer, blood clots, heart disease, and stroke  Ask your healthcare provider if HRT is right for you  How can I live a healthy lifestyle during and after menopause? After menopause, your risk for heart disease and bone loss increases  Ask about these and other ways to stay healthy:  · Exercise regularly  Exercise helps you maintain a healthy weight  Exercise can also help to control your blood pressure and cholesterol levels  Include weight-bearing exercise for strong bones  Ask your healthcare provider about the best exercise plan for you  · Eat a variety of healthy foods  Include fruits, vegetables, whole grains (whole-wheat bread, pasta, and cereals), low-fat dairy, and lean protein foods (beans, poultry, and fish)  Limit foods high in sodium (salt)  Ask your healthcare provider for more information about a meal plan that is right for you  · Maintain a healthy weight  Check with your healthcare provider before you start any weight loss program      · Take supplements as directed  You may need extra calcium and vitamin D to help prevent osteoporosis  · Limit alcohol and caffeine  Alcohol and caffeine may worsen your symptoms  · Do not smoke  If you smoke, it is never too late to quit  You are more likely to have a heart attack, lung disease, blood clots, and cancer if you smoke  Ask your healthcare provider for information if you need help quitting  When should I contact my healthcare provider? · You have vaginal bleeding after menopause  · You have questions or concerns about your condition or care  CARE AGREEMENT:   You have the right to help plan your care  Learn about your health condition and how it may be treated  Discuss treatment options with your caregivers to decide what care you want to receive  You always have the right to refuse treatment   The above information is an  only  It is not intended as medical advice for individual conditions or treatments  Talk to your doctor, nurse or pharmacist before following any medical regimen to see if it is safe and effective for you  © 2017 [address] Information is for End User's use only and may not be sold, redistributed or otherwise used for commercial purposes  All illustrations and images included in CareNotes® are the copyrighted property of [employer]  or Anthony Irizarry  Hormone Replacement Therapy in Women   WHAT YOU NEED TO KNOW:   What is hormone replacement therapy? Hormone replacement therapy (HRT) is medicine to replace your low hormone levels  HRT is most common for women who are going through menopause  HRT contains estrogen and sometimes progestin  What are hormones and how do they work? Hormones are chemicals that your body makes to control certain body functions  The main female hormones are estrogen and progesterone  These are made by your ovaries  These hormones are an important part of your reproductive system  What are the signs and symptoms of low hormone levels? · Hot flashes    · Sleep loss    · Depression, nervousness, or tiredness    · Trouble staying focused    · Vaginal dryness  Why might I have low hormone levels? · Menopause  is the most common reason for low hormone levels  The average age when a woman's monthly period stops is 51 years  · Removal of your ovaries  Menopause symptoms start right away after ovaries are removed  This happens because there is no more estrogen in your body  · Excessive exercise or weight loss  can cause your estrogen level to decrease  Your monthly periods may also stop  Who should not take HRT?   You should not take HRT if you have any of the following conditions:  · Breast cancer    · Cancer in the lining of your uterus or cancer of your ovaries    · Heart disease    · High blood pressure    · History of blood clots    · History of smoking, or current tobacco use    · Liver disease    · Unexplained vaginal bleeding  What else should I know about HRT? HRT helps prevent osteoporosis, which decreases your risk for bone fractures  HRT also protects you from colorectal cancer  HRT may increase your risk for breast cancer, blood clots, heart disease, and stroke  How often should I follow up with my healthcare provider? You will need to return to see your healthcare provider at least once a year  You may need tests, such a pap smear or mammogram  Your healthcare provider will ask how you are responding to HRT, and change your dose if needed  When should I contact my healthcare provider? · You have nausea or are vomiting  · You are depressed  · You have abdominal pain or cramping  · You have swollen or tender breasts  · You have more vaginal bleeding than expected  · You have sudden weight gain or loss  · You have questions or concerns about your condition or care  When should I seek immediate care or call 911? · You are confused  · Your arm or leg feels warm, tender, and painful  It may look swollen and red  · You feel lightheaded, short of breath, and have chest pain  · You cough up blood  · You have any of the following signs of a stroke:      ¨ Numbness or drooping on one side of your face     ¨ Weakness in an arm or leg    ¨ Confusion or difficulty speaking    ¨ Dizziness, a severe headache, or vision loss  CARE AGREEMENT:   You have the right to help plan your care  Learn about your health condition and how it may be treated  Discuss treatment options with your caregivers to decide what care you want to receive  You always have the right to refuse treatment  The above information is an  only  It is not intended as medical advice for individual conditions or treatments   Talk to your doctor, nurse or pharmacist before following any medical regimen to see if it is safe and effective for you  © 2017 [address] [facility] Information is for End User's use only and may not be sold, redistributed or otherwise used for commercial purposes  All illustrations and images included in CareNotes® are the copyrighted property of [employer]  or Anthony Irizarry

## 2020-07-27 NOTE — PROGRESS NOTES
Assessment/Plan   Diagnoses and all orders for this visit:    Women's annual routine gynecological examination    Screening for cervical cancer  -     Liquid-based pap, screening    Screening for HPV (human papillomavirus)  -     Liquid-based pap, screening    Symptomatic menopausal or female climacteric states  -     estradiol (Climara) 0 05 mg/24 hr; Place 1 patch on the skin once a week    Dense breasts    1  Yearly exam-Pap smear done with HPV testing at patient request, self-breast awareness reviewed, calcium/vitamin-D recommendations discussed, mammogram request given, colonoscopy as per specialist   2  History of bilateral breast pain/dense breast-denies any current symptoms  Most recent mammogram August 2019 3D was with dense changes  She had ABUS December 2019 and bilateral breast ultrasound October 2019 which were normal   Recommend screening 3D mammogram August 2020, request given  3  Menopause-continues on Climara patch  Generally, she changes the patch at 2-3 week intervals, instead of weekly     Electronic prescription was sent to mail order pharmacy at her request   4  Status supracervical Hyst with bilateral salpingo-oophorectomy-done around age 35    11  Osteopenia-DEXA scan August 2019 was with mild osteopenia, risk of fracture below that of treatment per FRAX recommendations  Recommend repeat DEXA scan August 2021   6  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  Support given regarding pandemic guidelines with schooling  To follow-up 1 year for yearly exam or as needed  Subjective   Patient ID: William Thomas is a 63 y o  female  Vitals:    07/27/20 1123   BP: 112/66   Temp: 98 9 °F (37 2 °C)     Patient was seen today for yearly exam   Please see assessment plan for details        The following portions of the patient's history were reviewed and updated as appropriate: allergies, current medications, past family history, past medical history, past social history, past surgical history and problem list   Past Medical History:   Diagnosis Date    Disease of thyroid gland     Endometriosis      Past Surgical History:   Procedure Laterality Date    REDUCTION MAMMAPLASTY Bilateral     TOTAL ABDOMINAL HYSTERECTOMY      age 38   Shara Tavarez TOTAL ABDOMINAL HYSTERECTOMY W/ BILATERAL SALPINGOOPHORECTOMY Bilateral     age 38     OB History    Para Term  AB Living   2 2 2     2   SAB TAB Ectopic Multiple Live Births                  # Outcome Date GA Lbr Cristo/2nd Weight Sex Delivery Anes PTL Lv   2 Term            1 Term                Current Outpatient Medications:     estradiol (Climara) 0 05 mg/24 hr, Place 1 patch on the skin once a week, Disp: 12 patch, Rfl: 3    levothyroxine 25 mcg tablet, Take by mouth, Disp: , Rfl:     fluticasone (FLONASE) 50 mcg/act nasal spray, 2 sprays into each nostril, Disp: , Rfl:   No Known Allergies  Social History     Socioeconomic History    Marital status: /Civil Union     Spouse name: None    Number of children: None    Years of education: None    Highest education level: None   Occupational History    None   Social Needs    Financial resource strain: None    Food insecurity:     Worry: None     Inability: None    Transportation needs:     Medical: None     Non-medical: None   Tobacco Use    Smoking status: Never Smoker    Smokeless tobacco: Never Used   Substance and Sexual Activity    Alcohol use: Yes     Comment: SOCIAL     Drug use: No    Sexual activity: None   Lifestyle    Physical activity:     Days per week: None     Minutes per session: None    Stress: None   Relationships    Social connections:     Talks on phone: None     Gets together: None     Attends Rastafari service: None     Active member of club or organization: None     Attends meetings of clubs or organizations: None     Relationship status: None    Intimate partner violence:     Fear of current or ex partner: None     Emotionally abused: None     Physically abused: None     Forced sexual activity: None   Other Topics Concern    None   Social History Narrative    None     Family History   Problem Relation Age of Onset    Breast cancer Mother 70    No Known Problems Father     No Known Problems Sister     No Known Problems Maternal Grandmother     No Known Problems Maternal Grandfather     No Known Problems Paternal Grandmother     No Known Problems Paternal Grandfather     No Known Problems Sister     No Known Problems Son     No Known Problems Son        Review of Systems   Constitutional: Negative for chills, diaphoresis, fatigue and fever  Respiratory: Negative for apnea, cough, chest tightness, shortness of breath and wheezing  Cardiovascular: Negative for chest pain, palpitations and leg swelling  Gastrointestinal: Negative for abdominal distention, abdominal pain, anal bleeding, constipation, diarrhea, nausea, rectal pain and vomiting  Genitourinary: Negative for difficulty urinating, dyspareunia, dysuria, frequency, hematuria, menstrual problem, pelvic pain, urgency, vaginal bleeding, vaginal discharge and vaginal pain  Musculoskeletal: Negative for arthralgias, back pain and myalgias  Skin: Negative for color change and rash  Neurological: Negative for dizziness, syncope, light-headedness, numbness and headaches  Hematological: Negative for adenopathy  Does not bruise/bleed easily  Psychiatric/Behavioral: Negative for dysphoric mood and sleep disturbance  The patient is not nervous/anxious          Objective   Physical Exam    Objective      /66 (BP Location: Left arm, Patient Position: Sitting, Cuff Size: Standard)   Temp 98 9 °F (37 2 °C)   Ht 5' 1" (1 549 m)   Wt 57 2 kg (126 lb)   BMI 23 81 kg/m²     General:   alert and oriented, in no acute distress   Neck: normal to inspection and palpation   Breast: normal appearance, no masses or tenderness   Heart:    Lungs:    Abdomen: soft, non-tender, without masses or organomegaly Vulva: normal   Vagina: Without erythema or lesions or discharge  Normal   Cervix: Without lesions or discharge or cervicitis    No Cervical motion tenderness   Uterus: top normal size, anteverted, non-tender   Adnexa: no mass, fullness, tenderness   Rectum: negative    Psych:  Normal mood and affect   Skin:  Without obvious lesions   Eyes: symmetric, with normal movements and reactivity   Musculoskeletal:  Normal muscle tone and movements appreciated

## 2020-07-30 LAB
HPV HR 12 DNA CVX QL NAA+PROBE: NEGATIVE
HPV16 DNA CVX QL NAA+PROBE: NEGATIVE
HPV18 DNA CVX QL NAA+PROBE: NEGATIVE

## 2020-08-03 LAB
LAB AP GYN PRIMARY INTERPRETATION: NORMAL
Lab: NORMAL

## 2020-08-26 ENCOUNTER — HOSPITAL ENCOUNTER (OUTPATIENT)
Dept: RADIOLOGY | Age: 55
Discharge: HOME | End: 2020-08-26
Payer: COMMERCIAL

## 2020-08-26 VITALS — BODY MASS INDEX: 23.6 KG/M2 | HEIGHT: 61 IN | WEIGHT: 125 LBS

## 2020-08-26 DIAGNOSIS — Z12.31 ENCOUNTER FOR SCREENING MAMMOGRAM FOR MALIGNANT NEOPLASM OF BREAST: ICD-10-CM

## 2020-08-26 PROCEDURE — 77067 SCR MAMMO BI INCL CAD: CPT

## 2020-08-26 PROCEDURE — 77063 BREAST TOMOSYNTHESIS BI: CPT

## 2021-04-05 DIAGNOSIS — Z23 ENCOUNTER FOR IMMUNIZATION: ICD-10-CM

## 2021-05-30 ENCOUNTER — APPOINTMENT (EMERGENCY)
Dept: RADIOLOGY | Facility: HOSPITAL | Age: 56
End: 2021-05-30
Payer: COMMERCIAL

## 2021-05-30 ENCOUNTER — HOSPITAL ENCOUNTER (EMERGENCY)
Facility: HOSPITAL | Age: 56
Discharge: HOME | End: 2021-05-30
Attending: EMERGENCY MEDICINE
Payer: COMMERCIAL

## 2021-05-30 VITALS
TEMPERATURE: 98.3 F | OXYGEN SATURATION: 100 % | RESPIRATION RATE: 18 BRPM | WEIGHT: 130 LBS | HEART RATE: 62 BPM | BODY MASS INDEX: 24.55 KG/M2 | HEIGHT: 61 IN | DIASTOLIC BLOOD PRESSURE: 74 MMHG | SYSTOLIC BLOOD PRESSURE: 139 MMHG

## 2021-05-30 DIAGNOSIS — M19.90 INFLAMMATORY ARTHRITIS: Primary | ICD-10-CM

## 2021-05-30 LAB
ALBUMIN SERPL BCP-MCNC: 4.2 G/DL (ref 3.5–5)
ALP SERPL-CCNC: 56 U/L (ref 46–116)
ALT SERPL W P-5'-P-CCNC: 36 U/L (ref 12–78)
ANION GAP SERPL CALCULATED.3IONS-SCNC: 4 MMOL/L (ref 4–13)
AST SERPL W P-5'-P-CCNC: 25 U/L (ref 5–45)
BILIRUB SERPL-MCNC: 0.36 MG/DL (ref 0.2–1)
BUN SERPL-MCNC: 16 MG/DL (ref 5–25)
CALCIUM SERPL-MCNC: 9 MG/DL (ref 8.3–10.1)
CHLORIDE SERPL-SCNC: 108 MMOL/L (ref 100–108)
CO2 SERPL-SCNC: 30 MMOL/L (ref 21–32)
CREAT SERPL-MCNC: 0.54 MG/DL (ref 0.6–1.3)
CRP SERPL QL: <3 MG/L
ERYTHROCYTE [DISTWIDTH] IN BLOOD BY AUTOMATED COUNT: 13.2 % (ref 11.6–15.1)
ERYTHROCYTE [SEDIMENTATION RATE] IN BLOOD: 10 MM/HOUR (ref 0–29)
GFR SERPL CREATININE-BSD FRML MDRD: 105 ML/MIN/1.73SQ M
GLUCOSE SERPL-MCNC: 85 MG/DL (ref 65–140)
HCT VFR BLD AUTO: 41.8 % (ref 34.8–46.1)
HGB BLD-MCNC: 13.5 G/DL (ref 11.5–15.4)
MCH RBC QN AUTO: 31.6 PG (ref 26.8–34.3)
MCHC RBC AUTO-ENTMCNC: 32.3 G/DL (ref 31.4–37.4)
MCV RBC AUTO: 98 FL (ref 82–98)
PLATELET # BLD AUTO: 265 THOUSANDS/UL (ref 149–390)
PMV BLD AUTO: 9.8 FL (ref 8.9–12.7)
POTASSIUM SERPL-SCNC: 3.6 MMOL/L (ref 3.5–5.3)
PROT SERPL-MCNC: 7.9 G/DL (ref 6.4–8.2)
RBC # BLD AUTO: 4.27 MILLION/UL (ref 3.81–5.12)
SODIUM SERPL-SCNC: 142 MMOL/L (ref 136–145)
URATE SERPL-MCNC: 3.3 MG/DL (ref 2–6.8)
WBC # BLD AUTO: 7 THOUSAND/UL (ref 4.31–10.16)

## 2021-05-30 PROCEDURE — 99283 EMERGENCY DEPT VISIT LOW MDM: CPT

## 2021-05-30 PROCEDURE — 85027 COMPLETE CBC AUTOMATED: CPT | Performed by: STUDENT IN AN ORGANIZED HEALTH CARE EDUCATION/TRAINING PROGRAM

## 2021-05-30 PROCEDURE — 73130 X-RAY EXAM OF HAND: CPT

## 2021-05-30 PROCEDURE — 36415 COLL VENOUS BLD VENIPUNCTURE: CPT | Performed by: STUDENT IN AN ORGANIZED HEALTH CARE EDUCATION/TRAINING PROGRAM

## 2021-05-30 PROCEDURE — 84550 ASSAY OF BLOOD/URIC ACID: CPT | Performed by: STUDENT IN AN ORGANIZED HEALTH CARE EDUCATION/TRAINING PROGRAM

## 2021-05-30 PROCEDURE — 99284 EMERGENCY DEPT VISIT MOD MDM: CPT | Performed by: EMERGENCY MEDICINE

## 2021-05-30 PROCEDURE — 85652 RBC SED RATE AUTOMATED: CPT | Performed by: STUDENT IN AN ORGANIZED HEALTH CARE EDUCATION/TRAINING PROGRAM

## 2021-05-30 PROCEDURE — 86140 C-REACTIVE PROTEIN: CPT | Performed by: STUDENT IN AN ORGANIZED HEALTH CARE EDUCATION/TRAINING PROGRAM

## 2021-05-30 PROCEDURE — 80053 COMPREHEN METABOLIC PANEL: CPT | Performed by: STUDENT IN AN ORGANIZED HEALTH CARE EDUCATION/TRAINING PROGRAM

## 2021-05-30 PROCEDURE — 96374 THER/PROPH/DIAG INJ IV PUSH: CPT

## 2021-05-30 RX ORDER — METHYLPREDNISOLONE SODIUM SUCCINATE 40 MG/ML
40 INJECTION, POWDER, LYOPHILIZED, FOR SOLUTION INTRAMUSCULAR; INTRAVENOUS DAILY
Status: DISCONTINUED | OUTPATIENT
Start: 2021-05-30 | End: 2021-05-30 | Stop reason: HOSPADM

## 2021-05-30 RX ORDER — PREDNISONE 20 MG/1
TABLET ORAL
Qty: 19 TABLET | Refills: 0 | Status: SHIPPED | OUTPATIENT
Start: 2021-05-30 | End: 2021-06-10

## 2021-05-30 RX ORDER — COLCHICINE 0.6 MG/1
1.2 TABLET ORAL ONCE
Status: COMPLETED | OUTPATIENT
Start: 2021-05-30 | End: 2021-05-30

## 2021-05-30 RX ORDER — NAPROXEN 500 MG/1
500 TABLET ORAL 2 TIMES DAILY WITH MEALS
Qty: 10 TABLET | Refills: 0 | Status: SHIPPED | OUTPATIENT
Start: 2021-05-30 | End: 2021-08-12

## 2021-05-30 RX ADMIN — COLCHICINE 1.2 MG: 0.6 TABLET, FILM COATED ORAL at 12:53

## 2021-05-30 RX ADMIN — METHYLPREDNISOLONE SODIUM SUCCINATE 40 MG: 40 INJECTION, POWDER, FOR SOLUTION INTRAMUSCULAR; INTRAVENOUS at 12:53

## 2021-05-30 NOTE — ED PROVIDER NOTES
History  Chief Complaint   Patient presents with    Hand Swelling     Reports waking up yesterday morning with swelling, pain, and localized erythema of right pinkie digit first knuckle  Pain and swelling now spreading to other fingers of same hand, with associated numbness and tingling  70-year-old female past medical history of hypothyroidism presents to the ED for 2 weeks of right hand swelling erythema and pain which has been exacerbated since yesterday  Patient refers that she has had tightness of bilateral upper extremities predominantly in the morning subclass up to 45 minutes and subsides throughout the day with motion  Denies fevers night sweats chills  Denies trauma to the area  Prior to Admission Medications   Prescriptions Last Dose Informant Patient Reported? Taking?   estradiol (Climara) 0 05 mg/24 hr   No No   Sig: Place 1 patch on the skin once a week   fluticasone (FLONASE) 50 mcg/act nasal spray   Yes No   Si sprays into each nostril   levothyroxine 25 mcg tablet   Yes No   Sig: Take by mouth      Facility-Administered Medications: None       Past Medical History:   Diagnosis Date    Disease of thyroid gland     Endometriosis        Past Surgical History:   Procedure Laterality Date    REDUCTION MAMMAPLASTY Bilateral     TOTAL ABDOMINAL HYSTERECTOMY      age 38   [place] [place] [facility] [place] TOTAL ABDOMINAL HYSTERECTOMY W/ BILATERAL SALPINGOOPHORECTOMY Bilateral     age 38       Family History   Problem Relation Age of Onset    Breast cancer Mother 70    No Known Problems Father     No Known Problems Sister     No Known Problems Maternal Grandmother     No Known Problems Maternal Grandfather     No Known Problems Paternal Grandmother     No Known Problems Paternal Grandfather     No Known Problems Sister     No Known Problems Son     No Known Problems Son      I have reviewed and agree with the history as documented      E-Cigarette/Vaping     E-Cigarette/Vaping Substances Social History     Tobacco Use    Smoking status: Never Smoker    Smokeless tobacco: Never Used   Substance Use Topics    Alcohol use: Yes     Comment: SOCIAL     Drug use: No        Review of Systems   Constitutional: Negative  HENT: Negative  Eyes: Negative  Respiratory: Negative  Cardiovascular: Negative  Gastrointestinal: Negative  Endocrine: Negative  Genitourinary: Negative  Musculoskeletal: Positive for joint swelling  Skin: Positive for color change  Allergic/Immunologic: Negative  Neurological: Negative  Hematological: Negative  Psychiatric/Behavioral: Negative  Physical Exam  ED Triage Vitals   Temperature Pulse Respirations Blood Pressure SpO2   05/30/21 1116 05/30/21 1111 05/30/21 1111 05/30/21 1111 05/30/21 1111   98 3 °F (36 8 °C) 62 18 139/74 100 %      Temp Source Heart Rate Source Patient Position - Orthostatic VS BP Location FiO2 (%)   05/30/21 1116 05/30/21 1111 05/30/21 1111 05/30/21 1111 --   Oral Monitor Sitting Right arm       Pain Score       05/30/21 1111       6             Orthostatic Vital Signs  Vitals:    05/30/21 1111   BP: 139/74   Pulse: 62   Patient Position - Orthostatic VS: Sitting       Physical Exam  Constitutional:       Appearance: Normal appearance  HENT:      Head: Normocephalic  Eyes:      Extraocular Movements: Extraocular movements intact  Neck:      Musculoskeletal: Normal range of motion  Cardiovascular:      Rate and Rhythm: Normal rate and regular rhythm  Pulses: Normal pulses  Heart sounds: Normal heart sounds  Pulmonary:      Effort: Pulmonary effort is normal       Breath sounds: Normal breath sounds  Abdominal:      General: Abdomen is flat  Palpations: Abdomen is soft  Musculoskeletal: Normal range of motion  General: Swelling and tenderness present  Skin:     Capillary Refill: Capillary refill takes less than 2 seconds     Neurological:      General: No focal deficit present  Mental Status: She is alert and oriented to person, place, and time           ED Medications  Medications   methylPREDNISolone sodium succinate (Solu-MEDROL) injection 40 mg (40 mg Intravenous Given 5/30/21 1253)   colchicine (COLCRYS) tablet 1 2 mg (1 2 mg Oral Given 5/30/21 1253)       Diagnostic Studies  Results Reviewed     Procedure Component Value Units Date/Time    Uric acid [[id number]]  (Normal) Collected: 05/30/21 1152    Lab Status: Final result Specimen: Blood from Arm, Left Updated: 05/30/21 1225     Uric Acid 3 3 mg/dL     C-reactive protein [[id number]]  (Normal) Collected: 05/30/21 1152    Lab Status: Final result Specimen: Blood from Arm, Left Updated: 05/30/21 1225     CRP <3 0 mg/L     Comprehensive metabolic panel [[id number]]  (Abnormal) Collected: 05/30/21 1152    Lab Status: Final result Specimen: Blood from Arm, Left Updated: 05/30/21 1213     Sodium 142 mmol/L      Potassium 3 6 mmol/L      Chloride 108 mmol/L      CO2 30 mmol/L      ANION GAP 4 mmol/L      BUN 16 mg/dL      Creatinine 0 54 mg/dL      Glucose 85 mg/dL      Calcium 9 0 mg/dL      AST 25 U/L      ALT 36 U/L      Alkaline Phosphatase 56 U/L      Total Protein 7 9 g/dL      Albumin 4 2 g/dL      Total Bilirubin 0 36 mg/dL      eGFR 105 ml/min/1 73sq m     Narrative:      [place] guidelines for Chronic Kidney Disease (CKD):     Stage 1 with normal or high GFR (GFR > 90 mL/min/1 73 square meters)    Stage 2 Mild CKD (GFR = 60-89 mL/min/1 73 square meters)    Stage 3A Moderate CKD (GFR = 45-59 mL/min/1 73 square meters)    Stage 3B Moderate CKD (GFR = 30-44 mL/min/1 73 square meters)    Stage 4 Severe CKD (GFR = 15-29 mL/min/1 73 square meters)    Stage 5 End Stage CKD (GFR <15 mL/min/1 73 square meters)  Note: GFR calculation is accurate only with a steady state creatinine    Sedimentation rate, automated [[id number]]  (Normal) Collected: 05/30/21 1152    Lab Status: Final result Specimen: Blood from Arm, Left Updated: 05/30/21 1203     Sed Rate 10 mm/hour     CBC [[id number]]  (Normal) Collected: 05/30/21 1152    Lab Status: Final result Specimen: Blood from Arm, Left Updated: 05/30/21 1158     WBC 7 00 Thousand/uL      RBC 4 27 Million/uL      Hemoglobin 13 5 g/dL      Hematocrit 41 8 %      MCV 98 fL      MCH 31 6 pg      MCHC 32 3 g/dL      RDW 13 2 %      Platelets 389 Thousands/uL      MPV 9 8 fL                  XR hand 3+ views RIGHT    (Results Pending)         Procedures  Procedures      ED Course      lab work within normal limits  Patient started on prednisone taper  Follow-up outpatient with Rheumatology /Orthopedics                                  MDM  Number of Diagnoses or Management Options  Inflammatory arthritis:   Diagnosis management comments: 48-year-old female with inflammatory arthritis  CRP ESR and x-ray CBC BMP uric acid      Disposition  Final diagnoses:   Inflammatory arthritis     Time reflects when diagnosis was documented in both MDM as applicable and the Disposition within this note     Time User Action Codes Description Comment    5/30/2021 12:22 PM Valentín DAVIS Add [L01 85] Inflammatory arthritis       ED Disposition     ED Disposition Condition Date/Time Comment    Discharge Stable Sun May 30, 2021  1:02 PM Carlene Osgood discharge to home/self care              Follow-up Information     Follow up With Specialties Details Why Contact Info Additional Information    Vianca Munoz MD Internal Medicine In 1 week  [address] [facility]  [address] [place] [address]  [phone] [address] [facility] Rheumatology Schedule an appointment as soon as possible for a visit in 1 week  Raje[address] Del[address] [facility] Rheumatology [address] [place] [address], [address] [place] [address], Karoline Rey 1    [facility] Orthopedic Surgery Schedule an appointment as soon as possible for a visit in 1 week  [address], [address], [address] [place], [place], [address] [phone]          Patient's Medications   Discharge Prescriptions    NAPROXEN (NAPROSYN) 500 MG TABLET    Take 1 tablet (500 mg total) by mouth 2 (two) times a day with meals for 5 days       Start Date: 5/30/2021 End Date: 6/4/2021       Order Dose: 500 mg       Quantity: 10 tablet    Refills: 0    PREDNISONE 20 MG TABLET    Take 3 tablets (60 mg total) by mouth daily for 3 days, THEN 2 tablets (40 mg total) daily for 3 days, THEN 1 tablet (20 mg total) daily for 3 days, THEN 0 5 tablets (10 mg total) daily for 2 days  Start Date: 5/30/2021 End Date: 6/10/2021       Order Dose: --       Quantity: 19 tablet    Refills: 0     No discharge procedures on file  PDMP Review     None           ED Provider  Attending physically available and evaluated Tyrese Conley I managed the patient along with the ED Attending      Electronically Signed by         Jeremy Oritz MD  05/30/21 00659 Chris Tinajero MD  05/30/21 0283

## 2021-05-31 NOTE — ED ATTENDING ATTESTATION
5/30/2021  IRyan DO, saw and evaluated the patient  I have discussed the patient with the resident/non-physician practitioner and agree with the resident's/non-physician practitioner's findings, Plan of Care, and MDM as documented in the resident's/non-physician practitioner's note, except where noted  All available labs and Radiology studies were reviewed  I was present for key portions of any procedure(s) performed by the resident/non-physician practitioner and I was immediately available to provide assistance  At this point I agree with the current assessment done in the Emergency Department  I have conducted an independent evaluation of this patient a history and physical is as follows:    60 yo F w/ intermittent hand pain and swelling, right side, for 2 weeks  Now worsening over the past day - c/o pain and swelling to right hand, laterally  No fevers/chills, no hx of gout, no recent injuries, no drug use  Minimal alcohol use  Physical: right hand, there is focal pain, swelling, erythema, tenderness to 5th MCP joint  Limited ROM  No streaking up arm  No wounds no fluctuance  xrays negative  Labs wnl  D/c home with treatment for inflammatory arthritis, possibly gout or pseudogout, vs  Other rheumatologic condition  F/u rheum/ortho      ED Course         Critical Care Time  Procedures

## 2021-06-02 ENCOUNTER — TELEPHONE (OUTPATIENT)
Dept: OBGYN CLINIC | Facility: HOSPITAL | Age: 56
End: 2021-06-02

## 2021-06-02 NOTE — TELEPHONE ENCOUNTER
Dr Kevin Francisco  RE: sooner appt  #      Patient was in the ER on 05 30 for inflammatory arthritis   Patient has a NP appt for 09 07 with Dr Chandu Quigley    Patient states she received a call from D/C nurse who states she needs to be evaluated by Rheum ASAP    Please advise if patient should be seen sooner

## 2021-06-02 NOTE — TELEPHONE ENCOUNTER
Labs so far look normal, please let her know we will keep her in mind for cancellations but for now she should keep scheduled appt, can also call back for cancellations

## 2021-06-02 NOTE — TELEPHONE ENCOUNTER
Please check if she would like to come into the [place] office this Friday at 1:30 PM  If she takes this appointment please cancel the new patient with Dr Vanessa Rivera, thanks

## 2021-06-02 NOTE — TELEPHONE ENCOUNTER
Spoke with patient and she will keep her original appointment and we will keep her on a cancellation list  Patient was happy with being put on a list for any cancellations

## 2021-06-03 NOTE — TELEPHONE ENCOUNTER
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

## 2021-08-12 ENCOUNTER — ANNUAL EXAM (OUTPATIENT)
Dept: OBGYN CLINIC | Facility: CLINIC | Age: 56
End: 2021-08-12
Payer: COMMERCIAL

## 2021-08-12 VITALS
DIASTOLIC BLOOD PRESSURE: 80 MMHG | SYSTOLIC BLOOD PRESSURE: 118 MMHG | WEIGHT: 127 LBS | BODY MASS INDEX: 24.94 KG/M2 | HEIGHT: 60 IN

## 2021-08-12 DIAGNOSIS — M85.80 OSTEOPENIA, UNSPECIFIED LOCATION: ICD-10-CM

## 2021-08-12 DIAGNOSIS — N95.1 SYMPTOMATIC MENOPAUSAL OR FEMALE CLIMACTERIC STATES: ICD-10-CM

## 2021-08-12 DIAGNOSIS — Z01.419 WOMEN'S ANNUAL ROUTINE GYNECOLOGICAL EXAMINATION: Primary | ICD-10-CM

## 2021-08-12 PROCEDURE — 99396 PREV VISIT EST AGE 40-64: CPT | Performed by: OBSTETRICS & GYNECOLOGY

## 2021-08-12 PROCEDURE — G0145 SCR C/V CYTO,THINLAYER,RESCR: HCPCS | Performed by: OBSTETRICS & GYNECOLOGY

## 2021-08-12 RX ORDER — TRIAMCINOLONE ACETONIDE 0.1 %
PASTE (GRAM) DENTAL
COMMUNITY
Start: 2021-05-29

## 2021-08-12 RX ORDER — ESTRADIOL 0.04 MG/D
1 PATCH TRANSDERMAL WEEKLY
Qty: 12 PATCH | Refills: 3 | Status: SHIPPED | OUTPATIENT
Start: 2021-08-12

## 2021-08-12 NOTE — PATIENT INSTRUCTIONS
Osteopenia   WHAT YOU NEED TO KNOW:   What is osteopenia? Osteopenia is a condition that causes bone loss and low bone mineral density (BMD)  Low BMD can weaken your bones and increase your risk for fractures  Osteopenia does not cause signs or symptoms  You may not know you have it until you break a bone  Osteopenia must be managed or treated so it does not worsen and become osteoporosis  Osteoporosis is a serious condition that causes bones to become weak, brittle, and easily fractured  What increases my risk for osteopenia? Your risk increases as you age and lose bone  The following may also increase your risk:  · Lack of weight-bearing exercise, or being bedridden    · Loss of testosterone (in men), or loss of estrogen, such as from menopause (in women)    · Family history of osteopenia or osteoporosis    · Alcohol abuse or smoking cigarettes    · Long-term use of a steroid medicine or an anticonvulsant medicine    · An eating disorder, such as anorexia    · Not enough calcium and vitamin D    How is osteopenia diagnosed and treated? · A bone scan  may be used to measure your bone density (thickness) and bone mass (amount)  You may need a bone scan if you are older than 65 years or you are in menopause  A bone scan may be used if you are younger than 72 years and at increased risk for fractures  A dual-energy x-ray absorptiometry (DXA) is a type of bone scan that measures the mineral content in your spine, hip, or forearm  DXA will give a number, called a T-score, based on how much bone mineral you have  The T-score shows your risk for fracture  · Treatment  depends on your risk for fracture  If your risk is low, you may only need to make exercise, nutrition, and other lifestyle changes to manage osteopenia  The changes can help prevent more bone mineral loss and reduce your risk for fractures   If your risk is high, you may be given medicines to help strengthen your bones, prevent bone breakdown, or Using the cpap sometimes  Has not had it changed in 15 years  Will send to dr mathew   increase bone formation  What can I do to manage or prevent osteopenia? · Exercise as directed  Do weight-bearing exercises, such as brisk walking, dancing, or yoga  Weight-bearing exercises help build or maintain bone  Exercises such as swimming and bike riding are non-weight-bearing and will not build or maintain bone  Weightlifting also helps strengthen bones and build muscle  Extra muscle can help protect your bones  Your healthcare provider may recommend weightlifting 3 times per week as part of your exercise routine  · Increase calcium and vitamin D as directed  Calcium and vitamin D work together to help build bone  The body deposits calcium into the bones until about age 27  You will then need to get enough calcium and vitamin D to maintain what your bones are storing  Your healthcare provider may tell you to eat more dairy products, such as milk and cheese, for calcium  Spinach, salmon, and dried beans are also good sources of calcium  Cereal, bread, and orange juice may be fortified with vitamin D  You also get vitamin D from exposure to sunlight  Your healthcare provider may also suggest a calcium or vitamin D supplement  Do not take supplements unless directed  · Limit or do not drink alcohol as directed  Alcohol can take calcium from your bones and increase your risk for fractures  Ask your healthcare provider if it is safe for you to drink alcohol  Women should limit alcohol to 1 drink per day  Men should limit alcohol to 2 drinks per day  A drink of alcohol is 12 ounces of beer, 5 ounces of wine, or 1½ ounces of liquor  · Do not smoke  Nicotine can damage blood vessels and make it more difficult to manage your osteopenia  Smoking also affects bone density and increases your risk for bone fractures  Do not use e-cigarettes or smokeless tobacco in place of cigarettes or to help you quit  They still contain nicotine   Ask your healthcare provider for information if you currently smoke and need help quitting  Ask your healthcare provider for information if you need help quitting  · Prevent falls  Decrease your risk for falling by removing items from the floor and removing loose carpets  Turn the lights on where you will be walking  CARE AGREEMENT:   You have the right to help plan your care  Learn about your health condition and how it may be treated  Discuss treatment options with your healthcare providers to decide what care you want to receive  You always have the right to refuse treatment  The above information is an  only  It is not intended as medical advice for individual conditions or treatments  Talk to your doctor, nurse or pharmacist before following any medical regimen to see if it is safe and effective for you  © Copyright [employer] 2021 Information is for End User's use only and may not be sold, redistributed or otherwise used for commercial purposes   All illustrations and images included in CareNotes® are the copyrighted property of A D A M , Inc  or [address] [place] [address] [employer]

## 2021-08-12 NOTE — PROGRESS NOTES
Assessment/Plan   Diagnoses and all orders for this visit:    Women's annual routine gynecological examination    Osteopenia, unspecified location  -     DXA bone density spine hip and pelvis; Future    Symptomatic menopausal or female climacteric states  -     estradiol (CLIMARA) 0 0375 MG/24HR; Place 1 patch on the skin once a week    Other orders  -     triamcinolone (KENALOG) 0 1 % oral topical paste    1  Yearly exam-Pap smear done with reflex HPV at patient request, self-breast awareness reviewed, calcium/vitamin-D recommendations discussed, mammogram request given, colonoscopy as per specialist   2  Menopausal symptoms-continues on Climara patch  She generally changes the patch at 3-4 week intervals instead of weekly  Previously, she was on 0 05 mg per 24 hour dose  We will decrease to 0 0375 mg per 24 hour dose and see how she does  She was again counseled about risks and benefits of hormone therapy  She has a new primary doctor who was urging her to get off of the hormones, and I would support that if the patient is tolerant  She is not ready to do that, we will try decreased dose as noted above  3  Status post supracervical hysterectomy with bilateral salpingo-oophorectomy-done around age 35   3  Osteopenia-DEXA scan August 2019 with mild osteopenia with fracture risk below that with recommendations for treatment per FRAX guidelines  Repeat DEXA scan ordered  Calcium, vitamin-D, and weight-bearing exercise recommended  5  History of bilateral breast pain/dense breast-did have some right upper quadrant breast discomfort which has resolved  She is due to get mammogram soon  Masses were noted  She will get the mammogram done and we will proceed accordingly  6  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  Follow-up 1 year for yearly exam or as needed  Subjective   Patient ID: Raúl Romero is a 61 y o  female      Vitals:    08/12/21 1320   BP: 118/80     Patient was seen today for yearly exam   Please see assessment plan for details        The following portions of the patient's history were reviewed and updated as appropriate: allergies, current medications, past family history, past medical history, past social history, past surgical history and problem list   Past Medical History:   Diagnosis Date    Disease of thyroid gland     Endometriosis      Past Surgical History:   Procedure Laterality Date    REDUCTION MAMMAPLASTY Bilateral     TOTAL ABDOMINAL HYSTERECTOMY      age 38   [place] TOTAL ABDOMINAL HYSTERECTOMY W/ BILATERAL SALPINGOOPHORECTOMY Bilateral     age 38     OB History    Para Term  AB Living   2 2 2     2   SAB TAB Ectopic Multiple Live Births                  # Outcome Date GA Lbr [place]/2nd Weight Sex Delivery Anes PTL Lv   2 Term            1 Term                Current Outpatient Medications:     fluticasone (FLONASE) 50 mcg/act nasal spray, 2 sprays into each nostril, Disp: , Rfl:     levothyroxine 25 mcg tablet, Take by mouth, Disp: , Rfl:     naproxen (NAPROSYN) 500 mg tablet, Take 1 tablet (500 mg total) by mouth 2 (two) times a day with meals for 5 days, Disp: 10 tablet, Rfl: 0    triamcinolone (KENALOG) 0 1 % oral topical paste, , Disp: , Rfl:     estradiol (CLIMARA) 0 0375 MG/24HR, Place 1 patch on the skin once a week, Disp: 12 patch, Rfl: 3  No Known Allergies  Social History     Socioeconomic History    Marital status: /Civil Union     Spouse name: None    Number of children: None    Years of education: None    Highest education level: None   Occupational History    None   Tobacco Use    Smoking status: Never Smoker    Smokeless tobacco: Never Used   Vaping Use    Vaping Use: Never used   Substance and Sexual Activity    Alcohol use: Yes     Comment: SOCIAL     Drug use: No    Sexual activity: Yes     Partners: Male   Other Topics Concern    None   Social History Narrative    None     Social Determinants of Health     Financial Resource Strain:     Difficulty of Paying Living Expenses:    Food Insecurity:     Worried About Running Out of Food in the Last Year:     [address] in the Last Year:    Transportation Needs:     Lack of Transportation (Medical):  Lack of Transportation (Non-Medical):    Physical Activity:     Days of Exercise per Week:     Minutes of Exercise per Session:    Stress:     Feeling of Stress :    Social Connections:     Frequency of Communication with Friends and Family:     Frequency of Social Gatherings with Friends and Family:     Attends [employer] Services:     Active Member of Clubs or Organizations:     Attends Club or Organization Meetings:     Marital Status:    Intimate Partner Violence:     Fear of Current or Ex-Partner:     Emotionally Abused:     Physically Abused:     Sexually Abused:      Family History   Problem Relation Age of Onset    Breast cancer Mother 70    No Known Problems Father     No Known Problems Sister     No Known Problems Maternal Grandmother     No Known Problems Maternal Grandfather     No Known Problems Paternal Grandmother     No Known Problems Paternal Grandfather     No Known Problems Sister     No Known Problems Son     No Known Problems Son        Review of Systems   Constitutional: Negative for chills, diaphoresis, fatigue and fever  Respiratory: Negative for apnea, cough, chest tightness, shortness of breath and wheezing  Cardiovascular: Negative for chest pain, palpitations and leg swelling  Gastrointestinal: Negative for abdominal distention, abdominal pain, anal bleeding, constipation, diarrhea, nausea, rectal pain and vomiting  Genitourinary: Negative for difficulty urinating, dyspareunia, dysuria, frequency, hematuria, menstrual problem, pelvic pain, urgency, vaginal bleeding, vaginal discharge and vaginal pain  Musculoskeletal: Negative for arthralgias, back pain and myalgias     Skin: Negative for color change and rash  Neurological: Negative for dizziness, syncope, light-headedness, numbness and headaches  Hematological: Negative for adenopathy  Does not bruise/bleed easily  Psychiatric/Behavioral: Negative for dysphoric mood and sleep disturbance  The patient is not nervous/anxious  Objective   Physical Exam  OBGyn Exam     Objective      /80 (BP Location: Left arm, Patient Position: Sitting, Cuff Size: Adult)   Ht 5' (1 524 m)   Wt 57 6 kg (127 lb)   BMI 24 80 kg/m²     General:   alert and oriented, in no acute distress   Neck: normal to inspection and palpation   Breast: normal appearance, no masses or tenderness   Heart:    Lungs:    Abdomen: soft, non-tender, without masses or organomegaly   Vulva: normal   Vagina: Without erythema or lesions or discharge  Normal   Cervix: Without lesions or discharge or cervicitis    No Cervical motion tenderness   Uterus: surgically absent   Adnexa: no mass, fullness, tenderness   Rectum: negative    Psych:  Normal mood and affect   Skin:  Without obvious lesions   Eyes: symmetric, with normal movements and reactivity   Musculoskeletal:  Normal muscle tone and movements appreciated

## 2021-08-19 LAB
LAB AP GYN PRIMARY INTERPRETATION: NORMAL
Lab: NORMAL

## 2021-08-27 ENCOUNTER — HOSPITAL ENCOUNTER (OUTPATIENT)
Dept: RADIOLOGY | Age: 56
Discharge: HOME | End: 2021-08-27
Payer: COMMERCIAL

## 2021-08-27 VITALS — WEIGHT: 127 LBS | BODY MASS INDEX: 24.94 KG/M2 | HEIGHT: 60 IN

## 2021-08-27 DIAGNOSIS — Z12.31 ENCOUNTER FOR SCREENING MAMMOGRAM FOR MALIGNANT NEOPLASM OF BREAST: ICD-10-CM

## 2021-08-27 PROCEDURE — 77063 BREAST TOMOSYNTHESIS BI: CPT

## 2021-08-27 PROCEDURE — 77067 SCR MAMMO BI INCL CAD: CPT

## 2021-09-03 NOTE — PROGRESS NOTES
Assessment and Plan:   Patient is a 69-year-old female who presents for rheumatology consult regarding hand arthralgia and episode of joint pain and swelling in the right hand  She describes chronic stiffness and arthralgia in her hands for the last 2 years, however does not describe typical features of an inflammatory arthritis as she does not seem to get improvement with gradual movement through the day  She had an episode of sudden onset joint pain and swelling at the right 5th MCP extending into the right 5th finger in May this year  This has never happened in the past at any other joints  She went to an urgent care and they felt this was likely a crystalline arthritis and gave her prednisone, which did resolve this  We discussed that I agree the possibility include gout versus pseudogout, which would be more likely given her demographic  Also given the location of the flare I would suspect pseudogout more so than gout  However she did drink wine for several days in a row that weekend and possibly this could have triggered a gout flare  Fortunately this has not recurred and we discussed that this is unpredictable whether or not gout or pseudogout would recur  We will do her complete rheumatologic workup to be thorough but suspicion for another etiology is low  We will follow-up on an as-needed basis as long as her workup is negative  She will let me know if she has any flares that need to be addressed otherwise  We discussed that ideally with a flare, if it is in a larger joint like the knee, we try to aspirate fluid to confirm a diagnosis of gout or pseudogout, but with the smaller joints is not possible and we would treated empirically with prednisone      Plan:  Diagnoses and all orders for this visit:    Arthralgia of both hands  -     SABIHA Screen w/ Reflex to Titer/Pattern  -     Cyclic citrul peptide antibody, IgG  -     C-reactive protein  -     Sedimentation rate, automated  -     Sjogren's Antibodies  -     RF Screen w/ Reflex to Titer    Inflammatory arthritis    Other orders  -     Cancel: Chronic Hepatitis Panel        Follow-up plan: PRN       HPI  Carito So is a 61 y o   female with hypothyroidism, hypercholesterolemia who presents for rheumatology consult by request of Dr Renetta Simon for polyarthralgia  Patient went to the ED at the end of May this year with joint pain and swelling in her hands and she was referred for further rheumatologic evaluation  She was discharged from the ED with a high dose prednisone taper starting at 60 mg  Reports in may this year memorial day weekend, she had onset of pain and swelling in her right hand  This was localized around the 5th right MCP joint and swelling extend into the 5th finger  She does have a picture on her phone which showed significant redness and swelling there  Recalls the prednisone she got from the urgent care resolved this issue  This has never happened in the past     Came on suddenly and she woke up that way  She recalls over that weekend she did drink wine a few days in a row, but this is not unusual for her  She did read about possible gout triggers as the urgent care thought this could be gout  She states she does not have any of the typical triggers in her diet  Since then she also did cut back on her red wine intake and only had it again recently last night  She does usually have joint stiffness in her hands in the morning, which really does not go away throughout the day  Also feels this in her shoulders  This is only brief in the morning and does not limit her in any way  Hands just don't move as easily as when she was younger  She drops a lot of things  She is on HRT, had SHAHNAZ 20 years ago, she is trying to wean off the HRT now  Denies photosensitivity, rashes, psoriasis, sicca symptoms, oral or nasal ulcers, alopecia, Raynaud's, h/o pericarditis or pleurisy, h/o blood clots         Review of Systems  Review of Systems   Constitutional: Negative for chills, fatigue, fever and unexpected weight change  HENT: Negative for mouth sores and trouble swallowing  Eyes: Negative for pain and visual disturbance  Respiratory: Negative for cough and shortness of breath  Cardiovascular: Negative for chest pain and leg swelling  Gastrointestinal: Negative for abdominal pain, blood in stool, constipation, diarrhea and nausea  Musculoskeletal: Positive for arthralgias and joint swelling  Negative for back pain and myalgias  Skin: Negative for color change and rash  Neurological: Negative for weakness and numbness  Hematological: Negative for adenopathy  Psychiatric/Behavioral: Negative for sleep disturbance  Allergies  No Known Allergies    Home Medications    Current Outpatient Medications:     estradiol (CLIMARA) 0 0375 MG/24HR, Place 1 patch on the skin once a week, Disp: 12 patch, Rfl: 3    levothyroxine 25 mcg tablet, Take by mouth, Disp: , Rfl:     triamcinolone (KENALOG) 0 1 % oral topical paste, , Disp: , Rfl:     fluticasone (FLONASE) 50 mcg/act nasal spray, 2 sprays into each nostril, Disp: , Rfl:     naproxen (NAPROSYN) 500 mg tablet, Take 1 tablet (500 mg total) by mouth 2 (two) times a day with meals for 5 days, Disp: 10 tablet, Rfl: 0    Past Medical History  Past Medical History:   Diagnosis Date    Disease of thyroid gland     Endometriosis        Past Surgical History   Past Surgical History:   Procedure Laterality Date    REDUCTION MAMMAPLASTY Bilateral 2009    TOTAL ABDOMINAL HYSTERECTOMY      age 38   [facility] TOTAL ABDOMINAL HYSTERECTOMY W/ BILATERAL SALPINGOOPHORECTOMY Bilateral 2000    age 38       Family History  No known family history of autoimmune or inflammatory diseases      Family History   Problem Relation Age of Onset    Breast cancer Mother 70    No Known Problems Father     No Known Problems Sister     No Known Problems Maternal Grandmother     No Known Problems Maternal Grandfather     No Known Problems Paternal Grandmother     No Known Problems Paternal Grandfather     No Known Problems Sister     No Known Problems Son     No Known Problems Son        Social History  Social History     Substance and Sexual Activity   Alcohol Use Yes    Comment: wine 4 times a week      Social History     Substance and Sexual Activity   Drug Use Never     Social History     Tobacco Use   Smoking Status Former Smoker    Types: Cigarettes   Smokeless Tobacco Never Used       Objective:    Vitals:    09/07/21 1342   Pulse: 60   Weight: 57 6 kg (127 lb)   Height: 5' (1 524 m)       Physical Exam  Constitutional:       General: She is not in acute distress  Appearance: She is well-developed  HENT:      Head: Normocephalic and atraumatic  Eyes:      General: Lids are normal  No scleral icterus  Conjunctiva/sclera: Conjunctivae normal    Pulmonary:      Effort: Pulmonary effort is normal  No tachypnea, accessory muscle usage or respiratory distress  Musculoskeletal:      Cervical back: Neck supple  Comments: No joint swelling or synovitis anywhere  No reproducible soft tissue or joint tenderness  Skin:     General: Skin is dry  Findings: No rash  Neurological:      Mental Status: She is alert  Psychiatric:         Behavior: Behavior normal  Behavior is cooperative  Imaging:   XR hands 2/2020: Impression: Unremarkable bilateral hands         Labs:    Ref Range & Units 6/16/21  6:30 AM   Thyroid Stimulating Hormone 0 36 - 3 74 uIU/mL 2 48        Component      Latest Ref Rng & Units 5/30/2021   Sodium      136 - 145 mmol/L 142   Potassium      3 5 - 5 3 mmol/L 3 6   Chloride      100 - 108 mmol/L 108   CO2      21 - 32 mmol/L 30   Anion Gap      4 - 13 mmol/L 4   BUN      5 - 25 mg/dL 16   Creatinine      0 60 - 1 30 mg/dL 0 54 (L)   Glucose, Random      65 - 140 mg/dL 85   Calcium      8 3 - 10 1 mg/dL 9 0   AST      5 - 45 U/L 25   ALT 12 - 78 U/L 36   Alkaline Phosphatase      46 - 116 U/L 56   Total Protein      6 4 - 8 2 g/dL 7 9   Albumin      3 5 - 5 0 g/dL 4 2   TOTAL BILIRUBIN      0 20 - 1 00 mg/dL 0 36   eGFR      ml/min/1 73sq m 105   WBC      4 31 - 10 16 Thousand/uL 7 00   Red Blood Cell Count      3 81 - 5 12 Million/uL 4 27   Hemoglobin      11 5 - 15 4 g/dL 13 5   HCT      34 8 - 46 1 % 41 8   MCV      82 - 98 fL 98   MCH      26 8 - 34 3 pg 31 6   MCHC      31 4 - 37 4 g/dL 32 3   RDW      11 6 - 15 1 % 13 2   Platelet Count      414 - 390 Thousands/uL 265   MPV      8 9 - 12 7 fL 9 8   URIC ACID      2 0 - 6 8 mg/dL 3 3   Sed Rate      0 - 29 mm/hour 10   C-REACTIVE PROTEIN      <3 0 mg/L <3 0      Ref Range & Units 2/20/20  1:05 PM   Rheumatoid Factor <15 IU/mL <10       Ref Range & Units 7/05/03  4:75 PM   Cyclic Citrullinated Peptide Ab (IgG) <6 0 RU/mL <3 0

## 2021-09-07 ENCOUNTER — HOSPITAL ENCOUNTER (OUTPATIENT)
Dept: ULTRASOUND IMAGING | Facility: CLINIC | Age: 56
Discharge: HOME | End: 2021-09-07
Payer: COMMERCIAL

## 2021-09-07 ENCOUNTER — OFFICE VISIT (OUTPATIENT)
Dept: RHEUMATOLOGY | Facility: CLINIC | Age: 56
End: 2021-09-07
Payer: COMMERCIAL

## 2021-09-07 ENCOUNTER — HOSPITAL ENCOUNTER (OUTPATIENT)
Dept: MAMMOGRAPHY | Facility: CLINIC | Age: 56
Discharge: HOME | End: 2021-09-07
Payer: COMMERCIAL

## 2021-09-07 VITALS — BODY MASS INDEX: 24.94 KG/M2 | HEIGHT: 60 IN | WEIGHT: 127 LBS

## 2021-09-07 VITALS — HEIGHT: 60 IN | HEART RATE: 60 BPM | WEIGHT: 127 LBS | BODY MASS INDEX: 24.94 KG/M2

## 2021-09-07 DIAGNOSIS — M19.90 INFLAMMATORY ARTHRITIS: ICD-10-CM

## 2021-09-07 DIAGNOSIS — R92.8 ABNORMAL MAMMOGRAM: ICD-10-CM

## 2021-09-07 DIAGNOSIS — M25.541 ARTHRALGIA OF BOTH HANDS: Primary | ICD-10-CM

## 2021-09-07 DIAGNOSIS — M25.542 ARTHRALGIA OF BOTH HANDS: Primary | ICD-10-CM

## 2021-09-07 PROCEDURE — G0279 TOMOSYNTHESIS, MAMMO: HCPCS

## 2021-09-07 PROCEDURE — 77065 DX MAMMO INCL CAD UNI: CPT

## 2021-09-07 PROCEDURE — 99244 OFF/OP CNSLTJ NEW/EST MOD 40: CPT | Performed by: INTERNAL MEDICINE

## 2021-10-01 DIAGNOSIS — R76.8 ANA POSITIVE: Primary | ICD-10-CM

## 2022-08-18 ENCOUNTER — ANNUAL EXAM (OUTPATIENT)
Dept: OBGYN CLINIC | Facility: CLINIC | Age: 57
End: 2022-08-18
Payer: COMMERCIAL

## 2022-08-18 VITALS
SYSTOLIC BLOOD PRESSURE: 116 MMHG | BODY MASS INDEX: 25.95 KG/M2 | WEIGHT: 132.2 LBS | DIASTOLIC BLOOD PRESSURE: 70 MMHG | HEIGHT: 60 IN

## 2022-08-18 DIAGNOSIS — Z12.31 ENCOUNTER FOR SCREENING MAMMOGRAM FOR BREAST CANCER: ICD-10-CM

## 2022-08-18 DIAGNOSIS — Z01.419 WOMEN'S ANNUAL ROUTINE GYNECOLOGICAL EXAMINATION: Primary | ICD-10-CM

## 2022-08-18 DIAGNOSIS — N95.1 SYMPTOMATIC MENOPAUSAL OR FEMALE CLIMACTERIC STATES: ICD-10-CM

## 2022-08-18 DIAGNOSIS — N95.2 VAGINAL ATROPHY: ICD-10-CM

## 2022-08-18 DIAGNOSIS — M85.80 OSTEOPENIA, UNSPECIFIED LOCATION: ICD-10-CM

## 2022-08-18 PROCEDURE — S0612 ANNUAL GYNECOLOGICAL EXAMINA: HCPCS | Performed by: OBSTETRICS & GYNECOLOGY

## 2022-08-18 PROCEDURE — G0145 SCR C/V CYTO,THINLAYER,RESCR: HCPCS | Performed by: OBSTETRICS & GYNECOLOGY

## 2022-08-18 NOTE — PROGRESS NOTES
Assessment/Plan   Diagnoses and all orders for this visit:    Women's annual routine gynecological examination    Encounter for screening mammogram for breast cancer  -     Mammo screening bilateral w 3d & cad; Future    Osteopenia, unspecified location    Symptomatic menopausal or female climacteric states    Vaginal atrophy    1  Yearly exam-Pap smear done with reflex HPV at patient request, self-breast awareness reviewed, calcium/vitamin-D recommendations discussed, mammogram request given, colonoscopy as per specialist   Had colonoscopy done recently, follow-up in around 8 years as per specialist by patient report  2  Menopausal symptoms-was on Climara patch from age 35  Previously was on 0 05 mg per 24 dose, took this about every 3 weeks  She was decreased to 0 0375 mg dose at last visit August 2021  She saw a new primary doctor who recommended she get off of this  She has been off of it since December 2021  She continues to note significant night sweats with interference of sleep  She also notes weight gain of about 6 lb  We discussed options  Practical recommendations were reviewed  She has consider going back on Climara  Risks and benefits were reviewed  She will call with any questions  3  Status post supracervical hysterectomy with bilateral salpingo-oophorectomy-done age 35   3  Benjamín Simon scan with mild osteopenia from August 2019  Calcium, vitamin-D, and weight-bearing exercise recommended  Was given dexa request last year, has not been done as far as I can tell  Request was printed out again for today and she was encouraged to get this done in the near future  5  Breast/previous dense breast-did have mammogram August 2021 with follow-up left diagnostic mammogram which was okay  Screening was recommended in follow-up, request given today  No increased breast density was noted on most recent imaging, to continue 3D imaging  6   Weight gain-patient is convinced it is related to going off the hormones  Diet and exercise recommended  7  Sinus issues-having CT scan of the sinuses tomorrow  My support was given  8  Other-sedimentation rate down to 5  To follow up with treating doctor  5  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 for yearly exam as needed  Subjective   Patient ID: Davey Phan is a 61 y o  female  Vitals:    [id number]   BP: 116/70     Patient was seen today for yearly exam   Please see assessment plan for details        The following portions of the patient's history were reviewed and updated as appropriate: allergies, current medications, past family history, past medical history, past social history, past surgical history and problem list   Past Medical History:   Diagnosis Date    Dental disease     Disease of thyroid gland     Ear problems     Endometriosis      Past Surgical History:   Procedure Laterality Date    ADENOIDECTOMY      NASAL SEPTUM SURGERY      REDUCTION MAMMAPLASTY Bilateral     TOTAL ABDOMINAL HYSTERECTOMY      age 38   [facility] TOTAL ABDOMINAL HYSTERECTOMY W/ BILATERAL SALPINGOOPHORECTOMY Bilateral     age 38     OB History    Para Term  AB Living   2 2 2     2   SAB IAB Ectopic Multiple Live Births                  # Outcome Date GA Lbr Cristo/2nd Weight Sex Delivery Anes PTL Lv   2 Term            1 Term                Current Outpatient Medications:     levothyroxine 25 mcg tablet, Take by mouth, Disp: , Rfl:     estradiol (CLIMARA) 0 0375 MG/24HR, Place 1 patch on the skin once a week (Patient not taking: Reported on 2022), Disp: 12 patch, Rfl: 3    fluticasone (FLONASE) 50 mcg/act nasal spray, 2 sprays into each nostril, Disp: , Rfl:     naproxen (NAPROSYN) 500 mg tablet, Take 1 tablet (500 mg total) by mouth 2 (two) times a day with meals for 5 days, Disp: 10 tablet, Rfl: 0    triamcinolone (KENALOG) 0 1 % oral topical paste, , Disp: , Rfl:   No Known Allergies  Social History     Socioeconomic History    Marital status: /Civil Union     Spouse name: None    Number of children: None    Years of education: None    Highest education level: None   Occupational History    None   Tobacco Use    Smoking status: Former Smoker     Types: Cigarettes    Smokeless tobacco: Never Used   Vaping Use    Vaping Use: Never used   Substance and Sexual Activity    Alcohol use: Yes     Comment: wine 4 times a week     Drug use: Never    Sexual activity: Yes     Partners: Male   Other Topics Concern    None   Social History Narrative    None     Social Determinants of Health     Financial Resource Strain: Not on file   Food Insecurity: Not on file   Transportation Needs: Not on file   Physical Activity: Not on file   Stress: Not on file   Social Connections: Not on file   Intimate Partner Violence: Not on file   Housing Stability: Not on file     Family History   Problem Relation Age of Onset    Breast cancer Mother 70    No Known Problems Father     No Known Problems Sister     No Known Problems Maternal Grandmother     No Known Problems Maternal Grandfather     No Known Problems Paternal Grandmother     No Known Problems Paternal Grandfather     No Known Problems Sister     No Known Problems Son     No Known Problems Son        Review of Systems   Constitutional: Negative for chills, diaphoresis, fatigue and fever  Respiratory: Negative for apnea, cough, chest tightness, shortness of breath and wheezing  Cardiovascular: Negative for chest pain, palpitations and leg swelling  Gastrointestinal: Negative for abdominal distention, abdominal pain, anal bleeding, constipation, diarrhea, nausea, rectal pain and vomiting  Genitourinary: Negative for difficulty urinating, dyspareunia, dysuria, frequency, hematuria, menstrual problem, pelvic pain, urgency, vaginal bleeding, vaginal discharge and vaginal pain  Musculoskeletal: Negative for arthralgias, back pain and myalgias     Skin: Negative for color change and rash    Neurological: Negative for dizziness, syncope, light-headedness, numbness and headaches  Hematological: Negative for adenopathy  Does not bruise/bleed easily  Psychiatric/Behavioral: Negative for dysphoric mood and sleep disturbance  The patient is not nervous/anxious  Objective   Physical Exam  OBGyn Exam     Objective      /70 (BP Location: Left arm, Patient Position: Sitting)   Ht 5' 0 25" (1 53 m)   Wt 60 kg (132 lb 3 2 oz)   BMI 25 60 kg/m²     General:   alert and oriented, in no acute distress   Neck: normal to inspection and palpation   Breast: normal appearance, no masses or tenderness   Heart:    Lungs:    Abdomen: soft, non-tender, without masses or organomegaly   Vulva: normal   Vagina: Mildly atrophic, without erythema or lesions or discharge  Cervix: Mildly atrophic, without lesions or discharge or cervicitis    No Cervical motion tenderness   Uterus: surgically absent   Adnexa: no mass, fullness, tenderness   Rectum: negative    Psych:  Normal mood and affect   Skin:  Without obvious lesions   Eyes: symmetric, with normal movements and reactivity   Musculoskeletal:  Normal muscle tone and movements appreciated

## 2022-08-19 ENCOUNTER — HOSPITAL ENCOUNTER (OUTPATIENT)
Dept: CT IMAGING | Facility: HOSPITAL | Age: 57
Discharge: HOME | End: 2022-08-19
Attending: OTOLARYNGOLOGY
Payer: COMMERCIAL

## 2022-08-19 DIAGNOSIS — R51.9 LEFT FACIAL PAIN: ICD-10-CM

## 2022-08-19 PROCEDURE — 70486 CT MAXILLOFACIAL W/O DYE: CPT

## 2022-08-19 PROCEDURE — G1004 CDSM NDSC: HCPCS

## 2022-08-24 ENCOUNTER — TELEPHONE (OUTPATIENT)
Dept: OBGYN CLINIC | Facility: CLINIC | Age: 57
End: 2022-08-24

## 2022-08-24 LAB
LAB AP GYN PRIMARY INTERPRETATION: NORMAL
Lab: NORMAL

## 2022-09-01 ENCOUNTER — TELEPHONE (OUTPATIENT)
Dept: NEUROLOGY | Facility: CLINIC | Age: 57
End: 2022-09-01

## 2022-09-01 NOTE — TELEPHONE ENCOUNTER
Patient called to schedule new patient appointment for left facial pain  Testing done  Triage intake sent

## 2023-01-19 ENCOUNTER — TELEPHONE (OUTPATIENT)
Dept: GYNECOLOGY | Facility: CLINIC | Age: 58
End: 2023-01-19

## 2023-01-19 NOTE — TELEPHONE ENCOUNTER
Patient left message c/o increased vaginal dryness, post coital pain, occasional sharp shooting pain, pelvic and bladder pressure x 2 to 3 weeks  Did not answer return call  Left message for patient to call back to schedule an appointment

## 2023-01-20 ENCOUNTER — OFFICE VISIT (OUTPATIENT)
Dept: GYNECOLOGY | Facility: CLINIC | Age: 58
End: 2023-01-20

## 2023-01-20 VITALS
HEIGHT: 61 IN | BODY MASS INDEX: 25.22 KG/M2 | DIASTOLIC BLOOD PRESSURE: 66 MMHG | WEIGHT: 133.6 LBS | SYSTOLIC BLOOD PRESSURE: 118 MMHG

## 2023-01-20 DIAGNOSIS — N94.10 DYSPAREUNIA, FEMALE: ICD-10-CM

## 2023-01-20 DIAGNOSIS — N95.2 VAGINAL ATROPHY: Primary | ICD-10-CM

## 2023-01-20 DIAGNOSIS — N95.1 SYMPTOMATIC MENOPAUSAL OR FEMALE CLIMACTERIC STATES: ICD-10-CM

## 2023-01-20 DIAGNOSIS — B37.31 VAGINAL CANDIDIASIS: ICD-10-CM

## 2023-01-20 DIAGNOSIS — R35.0 URINARY FREQUENCY: ICD-10-CM

## 2023-01-20 LAB
BACTERIA UR QL AUTO: NORMAL /HPF
BILIRUB UR QL STRIP: NEGATIVE
BV WHIFF TEST VAG QL: NEGATIVE
CLARITY UR: CLEAR
CLUE CELLS SPEC QL WET PREP: NEGATIVE
COLOR UR: COLORLESS
GLUCOSE UR STRIP-MCNC: NEGATIVE MG/DL
HGB UR QL STRIP.AUTO: NEGATIVE
KETONES UR STRIP-MCNC: NEGATIVE MG/DL
LEUKOCYTE ESTERASE UR QL STRIP: NEGATIVE
NITRITE UR QL STRIP: NEGATIVE
NON-SQ EPI CELLS URNS QL MICRO: NORMAL /HPF
PH SMN: ABNORMAL [PH]
PH UR STRIP.AUTO: 6.5 [PH]
PROT UR STRIP-MCNC: NEGATIVE MG/DL
RBC #/AREA URNS AUTO: NORMAL /HPF
SL AMB POCT WET MOUNT: YES
SP GR UR STRIP.AUTO: 1.01 (ref 1–1.03)
T VAGINALIS VAG QL WET PREP: NEGATIVE
UROBILINOGEN UR STRIP-ACNC: <2 MG/DL
WBC #/AREA URNS AUTO: NORMAL /HPF
YEAST VAG QL WET PREP: ABNORMAL

## 2023-01-20 RX ORDER — FLUCONAZOLE 150 MG/1
150 TABLET ORAL ONCE
Qty: 1 TABLET | Refills: 0 | Status: SHIPPED | OUTPATIENT
Start: 2023-01-20 | End: 2023-01-20

## 2023-01-20 RX ORDER — ESTRADIOL 0.1 MG/G
1 CREAM VAGINAL 3 TIMES WEEKLY
Qty: 42.5 G | Refills: 1 | Status: SHIPPED | OUTPATIENT
Start: 2023-01-20

## 2023-01-20 NOTE — PROGRESS NOTES
Assessment/Plan   Diagnoses and all orders for this visit:    Vaginal atrophy  -     estradiol (ESTRACE) 0 1 mg/g vaginal cream; Insert 1 g into the vagina 3 (three) times a week    Dyspareunia, female  -     estradiol (ESTRACE) 0 1 mg/g vaginal cream; Insert 1 g into the vagina 3 (three) times a week    Symptomatic menopausal or female climacteric states    Urinary frequency    Vaginal candidiasis  -     POCT wet mount  -     fluconazole (DIFLUCAN) 150 mg tablet; Take 1 tablet (150 mg total) by mouth once for 1 dose    1  vaginal atrophy with dyspareunia- noted intercourse about 2 5 weeks ago with significant pain sex to the point where she had to stop with intercourse  She again attempted intercourse on 1/16/2023, again was quite uncomfortable  Exam today was with vaginal atrophy and rare yeast found  We will treat yeast as noted below  Discussed vaginal atrophy  She does use lubrication with intercourse with overall good results prior to this  She is interested in vaginal estrogen  The patient was counseled about the risks of hormone replacement therapy  These included increased risk for breast cancer, blood clots, stroke, and heart disease  She was counseled that recommendations from ACOG and [place] [employer] menopause society state that hormone replacement therapy should be used at the lowest dose for the shortest amount of time  In addition, the most recent NAMS statement notes a lack of heart disease risk in those within 10 years of menopause or less than age 61  Also, breast cancer risk in this population does not seem to occur until after 3-5 years of use of hormones  She was counseled about alternatives, including nonhormonal treatment and oral, vaginal, and topical treatment options  After weighing the risks and benefits, she wished to [proceed with] hormonal therapy  Electronic prescription for Estrace cream to use 3 times weekly sent to local pharmacy    Recommend follow-up in 2 to 3 months time to see how she is doing  2  menopausal symptoms-was on Climara patch since  by her recollection  She stopped this 1 year ago due to concerns about side effects and on the advice of her primary physician  Frankly, she does not feel well off of the Climara  She is strongly considering going back on it  She was counseled about risks and benefits of hormone therapy  She will call or return if interested in restarting this  3  status post supracervical hysterectomy with BSO-done age 42  3  [id number] as per previous recommendations  5  other- prior increased breast density-resolved on most recent imaging  Had previous concerns of weight gain, sinus issues which have resolved  Patient is a teacher  6  urinary frequency-noted to mainly by the patient  Some discomfort was noted over the bladder but more discomfort was noted posteriorly in the vagina  To check urine culture to rule out UTI  Patient has had UTI before, does not feel that this is the problem  Follow-up 2023 for yearly exam or as needed  Subjective   Patient ID: Dwight Valdez is a 61 y o  female  Vitals:    23 [id number]   BP: 118/66     Patient was seen today for follow-up visit  Please see assessment plan for details        The following portions of the patient's history were reviewed and updated as appropriate: allergies, current medications, past family history, past medical history, past social history, past surgical history and problem list   Past Medical History:   Diagnosis Date   • Dental disease    • Disease of thyroid gland    • Ear problems    • Endometriosis      Past Surgical History:   Procedure Laterality Date   • ADENOIDECTOMY     • NASAL SEPTUM SURGERY     • REDUCTION MAMMAPLASTY Bilateral    • TOTAL ABDOMINAL HYSTERECTOMY      age 38   • TOTAL ABDOMINAL HYSTERECTOMY W/ BILATERAL SALPINGOOPHORECTOMY Bilateral     age 38     OB History    Para Term  AB Living   2 2 2     2   SAB IAB Ectopic Multiple Live Births                  # Outcome Date GA Lbr Cristo/2nd Weight Sex Delivery Anes PTL Lv   2 Term            1 Term                Current Outpatient Medications:   •  gabapentin (Neurontin) 100 mg capsule, Take 1 capsule (100 mg total) by mouth daily at bedtime, Disp: 30 capsule, Rfl: 6  •  levothyroxine 25 mcg tablet, Take by mouth, Disp: , Rfl:   •  estradiol (CLIMARA) 0 0375 MG/24HR, Place 1 patch on the skin once a week (Patient not taking: Reported on 8/18/2022), Disp: 12 patch, Rfl: 3  •  fluticasone (FLONASE) 50 mcg/act nasal spray, 2 sprays into each nostril, Disp: , Rfl:   •  naproxen (NAPROSYN) 500 mg tablet, Take 1 tablet (500 mg total) by mouth 2 (two) times a day with meals for 5 days, Disp: 10 tablet, Rfl: 0  •  triamcinolone (KENALOG) 0 1 % oral topical paste, , Disp: , Rfl:   No Known Allergies  Social History     Socioeconomic History   • Marital status: /Civil Union     Spouse name: None   • Number of children: None   • Years of education: None   • Highest education level: None   Occupational History   • None   Tobacco Use   • Smoking status: Former     Types: Cigarettes   • Smokeless tobacco: Never   Vaping Use   • Vaping Use: Never used   Substance and Sexual Activity   • Alcohol use: Yes     Comment: wine 4 times a week    • Drug use: Never   • Sexual activity: Yes     Partners: Male   Other Topics Concern   • None   Social History Narrative   • None     Social Determinants of Health     Financial Resource Strain: Not on file   Food Insecurity: Not on file   Transportation Needs: Not on file   Physical Activity: Not on file   Stress: Not on file   Social Connections: Not on file   Intimate Partner Violence: Not on file   Housing Stability: Not on file     Family History   Problem Relation Age of Onset   • Breast cancer Mother 70   • No Known Problems Father    • No Known Problems Sister    • No Known Problems Maternal Grandmother    • No Known Problems Maternal Grandfather    • No Known Problems Paternal Grandmother    • No Known Problems Paternal Grandfather    • No Known Problems Sister    • No Known Problems Son    • No Known Problems Son        Review of Systems   Constitutional: Negative for chills, diaphoresis, fatigue and fever  Respiratory: Negative for apnea, cough, chest tightness, shortness of breath and wheezing  Cardiovascular: Negative for chest pain, palpitations and leg swelling  Gastrointestinal: Negative for abdominal distention, abdominal pain, anal bleeding, constipation, diarrhea, nausea, rectal pain and vomiting  Genitourinary: Positive for dyspareunia  Negative for difficulty urinating, dysuria, frequency, hematuria, menstrual problem, pelvic pain, urgency, vaginal bleeding, vaginal discharge and vaginal pain  Musculoskeletal: Negative for arthralgias, back pain and myalgias  Skin: Negative for color change and rash  Neurological: Negative for dizziness, syncope, light-headedness, numbness and headaches  Hematological: Negative for adenopathy  Does not bruise/bleed easily  Psychiatric/Behavioral: Negative for dysphoric mood and sleep disturbance  The patient is not nervous/anxious  Discomfort with sex    Objective   Physical Exam  OBGyn Exam     Objective      /66 (BP Location: Left arm, Patient Position: Sitting, Cuff Size: Adult)   Ht 5' 1" (1 549 m)   Wt 60 6 kg (133 lb 9 6 oz)   BMI 25 24 kg/m²     General:   alert and oriented, in no acute distress   Neck:    Breast:    Heart:    Lungs:    Abdomen: soft, non-tender, without masses or organomegaly   Vulva: normal   Vagina:  Mildly atrophic, without erythema or lesions noted  Small amount of clear white discharge  Tenderness noted posteriorly in the vagina greater than anteriorly     Cervix: surgically absent   Uterus: surgically absent   Adnexa: no mass, fullness, tenderness   Rectum: deferred    Psych:  Normal mood and affect   Skin:  Without obvious lesions   Eyes: symmetric, with normal movements and reactivity   Musculoskeletal:  Normal muscle tone and movements appreciated

## 2023-01-22 LAB — BACTERIA UR CULT: NORMAL

## 2023-01-25 ENCOUNTER — HOSPITAL ENCOUNTER (OUTPATIENT)
Dept: RADIOLOGY | Age: 58
Discharge: HOME | End: 2023-01-25

## 2023-01-25 VITALS — WEIGHT: 132 LBS | BODY MASS INDEX: 24.92 KG/M2 | HEIGHT: 61 IN

## 2023-01-25 VITALS — HEIGHT: 61 IN | BODY MASS INDEX: 25.22 KG/M2 | WEIGHT: 133.6 LBS

## 2023-01-25 DIAGNOSIS — Z12.31 ENCOUNTER FOR SCREENING MAMMOGRAM FOR BREAST CANCER: ICD-10-CM

## 2023-01-25 DIAGNOSIS — M85.80 OSTEOPENIA, UNSPECIFIED LOCATION: ICD-10-CM

## 2023-06-07 DIAGNOSIS — N94.10 DYSPAREUNIA, FEMALE: ICD-10-CM

## 2023-06-07 DIAGNOSIS — N95.2 VAGINAL ATROPHY: ICD-10-CM

## 2023-06-07 RX ORDER — ESTRADIOL 0.1 MG/G
1 CREAM VAGINAL 3 TIMES WEEKLY
Qty: 42.5 G | Refills: 1 | Status: SHIPPED | OUTPATIENT
Start: 2023-06-07

## 2023-08-25 ENCOUNTER — ANNUAL EXAM (OUTPATIENT)
Dept: GYNECOLOGY | Facility: CLINIC | Age: 58
End: 2023-08-25
Payer: COMMERCIAL

## 2023-08-25 VITALS
DIASTOLIC BLOOD PRESSURE: 74 MMHG | HEIGHT: 60 IN | BODY MASS INDEX: 26.07 KG/M2 | SYSTOLIC BLOOD PRESSURE: 116 MMHG | WEIGHT: 132.8 LBS

## 2023-08-25 DIAGNOSIS — Z11.51 SCREENING FOR HUMAN PAPILLOMAVIRUS (HPV): ICD-10-CM

## 2023-08-25 DIAGNOSIS — Z01.419 WOMEN'S ANNUAL ROUTINE GYNECOLOGICAL EXAMINATION: Primary | ICD-10-CM

## 2023-08-25 DIAGNOSIS — N95.1 SYMPTOMATIC MENOPAUSAL OR FEMALE CLIMACTERIC STATES: ICD-10-CM

## 2023-08-25 DIAGNOSIS — Z12.31 ENCOUNTER FOR SCREENING MAMMOGRAM FOR BREAST CANCER: ICD-10-CM

## 2023-08-25 DIAGNOSIS — N95.2 VAGINAL ATROPHY: ICD-10-CM

## 2023-08-25 PROCEDURE — G0476 HPV COMBO ASSAY CA SCREEN: HCPCS | Performed by: OBSTETRICS & GYNECOLOGY

## 2023-08-25 PROCEDURE — G0145 SCR C/V CYTO,THINLAYER,RESCR: HCPCS | Performed by: OBSTETRICS & GYNECOLOGY

## 2023-08-25 PROCEDURE — S0612 ANNUAL GYNECOLOGICAL EXAMINA: HCPCS | Performed by: OBSTETRICS & GYNECOLOGY

## 2023-08-25 NOTE — PROGRESS NOTES
Assessment/Plan   Diagnoses and all orders for this visit:    Women's annual routine gynecological examination    Encounter for screening mammogram for breast cancer  -     Mammo screening bilateral w 3d & cad; Future    Vaginal atrophy    Symptomatic menopausal or female climacteric states    1. yearly exam- Pap smear done with HPV testing at patient request, self breast awareness reviewed, calcium/vitamin D recommendations discussed, mammogram request given, colonoscopy as per specialist  2. vaginal atrophy with dyspareunia- is improved with Estrace cream.  Generally, she uses this about once per week. Would suggest she might use this 2-3 times weekly. Vaginal lubrication/moisturizer sheet given, to use accordingly. 3.  Menopause symptoms- off of Climara, was on this from 2002 through 2022. She still does note some menopause symptoms. She is using Equelle with some improvement. This is soy based. She will continue to follow symptoms. Practical recommendations were reviewed. 4. osteopenia-DEXA scan 1/25/2023 lumbar spine T score -2.0, femoral neck -1.0. Major risk 6.9%, hip fracture risk 0.4% below that for which medical treatment is recommended. Calcium, vitamin D, weightbearing exercise recommended, consider follow-up DEXA scan in 2 years time January 2025  5. status post supracervical hysterectomy/BSO. -age 37  10. Urinary- does note mostly stress incontinence. Does wear pad sometimes. Counseled about the mechanism of this. Kegel's exercise recommended and sheet given. Physical therapy for women program discussed, she will consider this. Did discuss surgical treatment. Her neighbor had sling with good results. She is not ready for this but may consider it over the next couple of years. 7.  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 Follow-up 1 year for yearly exam or as needed. Subjective   Patient ID: Roderick Woodard is a 60 y.o. female. Vitals:    23 1333   BP: 116/74     Patient was seen today for yearly exam.  Please see assessment plan for details.       The following portions of the patient's history were reviewed and updated as appropriate: allergies, current medications, past family history, past medical history, past social history, past surgical history and problem list.  Past Medical History:   Diagnosis Date   • Dental disease    • Disease of thyroid gland    • Ear problems    • Endometriosis      Past Surgical History:   Procedure Laterality Date   • ADENOIDECTOMY     • NASAL SEPTUM SURGERY     • REDUCTION MAMMAPLASTY Bilateral    • TOTAL ABDOMINAL HYSTERECTOMY      age 38   • TOTAL ABDOMINAL HYSTERECTOMY W/ BILATERAL SALPINGOOPHORECTOMY Bilateral     age 38     OB History    Para Term  AB Living   2 2 2     2   SAB IAB Ectopic Multiple Live Births                  # Outcome Date GA Lbr Cristo/2nd Weight Sex Delivery Anes PTL Lv   2 Term            1 Term                Current Outpatient Medications:   •  estradiol (ESTRACE) 0.1 mg/g vaginal cream, Insert 1 g into the vagina 3 (three) times a week, Disp: 42.5 g, Rfl: 1  •  gabapentin (Neurontin) 100 mg capsule, Take 1 capsule (100 mg total) by mouth daily at bedtime, Disp: 30 capsule, Rfl: 6  •  levothyroxine 25 mcg tablet, Take by mouth, Disp: , Rfl:   •  estradiol (CLIMARA) 0.0375 MG/24HR, Place 1 patch on the skin once a week (Patient not taking: Reported on 2022), Disp: 12 patch, Rfl: 3  •  fluticasone (FLONASE) 50 mcg/act nasal spray, 2 sprays into each nostril, Disp: , Rfl:   •  naproxen (NAPROSYN) 500 mg tablet, Take 1 tablet (500 mg total) by mouth 2 (two) times a day with meals for 5 days, Disp: 10 tablet, Rfl: 0  •  triamcinolone (KENALOG) 0.1 % oral topical paste, , Disp: , Rfl:   No Known Allergies  Social History     Socioeconomic History   • Marital status: /Civil Union     Spouse name: None   • Number of children: None   • Years of education: None   • Highest education level: None   Occupational History   • None   Tobacco Use   • Smoking status: Former     Types: Cigarettes   • Smokeless tobacco: Never   Vaping Use   • Vaping Use: Never used   Substance and Sexual Activity   • Alcohol use: Yes     Comment: wine 4 times a week    • Drug use: Never   • Sexual activity: Yes     Partners: Male   Other Topics Concern   • None   Social History Narrative   • None     Social Determinants of Health     Financial Resource Strain: Not on file   Food Insecurity: Not on file   Transportation Needs: Not on file   Physical Activity: Not on file   Stress: Not on file   Social Connections: Not on file   Intimate Partner Violence: Not on file   Housing Stability: Not on file     Family History   Problem Relation Age of Onset   • Breast cancer Mother 70   • No Known Problems Father    • No Known Problems Sister    • No Known Problems Maternal Grandmother    • No Known Problems Maternal Grandfather    • No Known Problems Paternal Grandmother    • No Known Problems Paternal Grandfather    • No Known Problems Sister    • No Known Problems Son    • No Known Problems Son        Review of Systems   Constitutional: Negative for chills, diaphoresis, fatigue and fever. Respiratory: Negative for apnea, cough, chest tightness, shortness of breath and wheezing. Cardiovascular: Negative for chest pain, palpitations and leg swelling. Gastrointestinal: Negative for abdominal distention, abdominal pain, anal bleeding, constipation, diarrhea, nausea, rectal pain and vomiting. Genitourinary: Negative for difficulty urinating, dyspareunia, dysuria, frequency, hematuria, menstrual problem, pelvic pain, urgency, vaginal bleeding, vaginal discharge and vaginal pain. Musculoskeletal: Negative for arthralgias, back pain and myalgias. Skin: Negative for color change and rash. Neurological: Negative for dizziness, syncope, light-headedness, numbness and headaches. Hematological: Negative for adenopathy. Does not bruise/bleed easily. Psychiatric/Behavioral: Negative for dysphoric mood and sleep disturbance. The patient is not nervous/anxious. Objective   Physical Exam  OBGyn Exam     Objective      /74 (BP Location: Right arm, Patient Position: Sitting)   Ht 5' (1.524 m)   Wt 60.2 kg (132 lb 12.8 oz)   BMI 25.94 kg/m²     General:   alert and oriented, in no acute distress   Neck: normal to inspection and palpation   Breast: normal appearance, no masses or tenderness   Heart:    Lungs:    Abdomen: soft, non-tender, without masses or organomegaly   Vulva: normal   Vagina: Without erythema or lesions or discharge. Mildly atrophic   Cervix:  Mildly atrophic, without erythema or lesions or discharge.    Uterus:  Surgically absent   Adnexa: no mass, fullness, tenderness   Rectum: negative    Psych:  Normal mood and affect   Skin:  Without obvious lesions   Eyes: symmetric, with normal movements and reactivity   Musculoskeletal:  Normal muscle tone and movements appreciated

## 2023-09-01 LAB
LAB AP GYN PRIMARY INTERPRETATION: NORMAL
Lab: NORMAL

## 2024-01-30 ENCOUNTER — TELEPHONE (OUTPATIENT)
Dept: GYNECOLOGY | Facility: CLINIC | Age: 59
End: 2024-01-30

## 2024-01-30 DIAGNOSIS — N95.1 SYMPTOMATIC MENOPAUSAL OR FEMALE CLIMACTERIC STATES: ICD-10-CM

## 2024-01-30 RX ORDER — ESTRADIOL 0.04 MG/D
1 PATCH TRANSDERMAL WEEKLY
Qty: 12 PATCH | Refills: 1 | Status: SHIPPED | OUTPATIENT
Start: 2024-01-30

## 2024-01-30 RX ORDER — ESTRADIOL 0.04 MG/D
1 PATCH TRANSDERMAL WEEKLY
Qty: 12 PATCH | Refills: 1 | Status: SHIPPED | OUTPATIENT
Start: 2024-01-30 | End: 2024-01-30

## 2024-01-30 NOTE — TELEPHONE ENCOUNTER
Patient informed.  She wants a escript sent to [facility] for her patch..  She scheduled an appointment for a med check on 4/4/24.

## 2024-01-30 NOTE — PROGRESS NOTES
Patient called with continued menopausal symptoms which are significant.  She wished to restart Climara patch, electronic prescription was sent for 0.0 375 patch to use weekly, #12 refill 1.  She has follow-up appointment on 4/4/24 to assess response and review medical treatment options.

## 2024-01-30 NOTE — TELEPHONE ENCOUNTER
----- Message from Meseret Abdul sent at 1/30/2024 10:03 AM EST -----  Regarding: FW: Back on HRT  Contact: [phone]    ----- Message -----  From: Anatoliy Brewer MD  Sent: 1/30/2024   9:45 AM EST  To: [facility]  Subject: FW: Back on HRT                                  Please see note, recommend office visit regarding hormonal therapy.

## 2024-01-30 NOTE — Clinical Note
Initially, sent Climara to local pharmacy until I saw the final message that requested sent to [facility].  Please call local pharmacy to postpone this prescription.  Thanks

## 2024-01-31 ENCOUNTER — HOSPITAL ENCOUNTER (OUTPATIENT)
Dept: RADIOLOGY | Age: 59
Discharge: HOME | End: 2024-01-31
Payer: COMMERCIAL

## 2024-01-31 VITALS — WEIGHT: 133 LBS | BODY MASS INDEX: 26.11 KG/M2 | HEIGHT: 60 IN

## 2024-01-31 DIAGNOSIS — Z12.31 ENCOUNTER FOR SCREENING MAMMOGRAM FOR BREAST CANCER: ICD-10-CM

## 2024-01-31 PROCEDURE — 77063 BREAST TOMOSYNTHESIS BI: CPT

## 2024-01-31 PROCEDURE — 77067 SCR MAMMO BI INCL CAD: CPT

## 2024-02-07 DIAGNOSIS — N95.1 SYMPTOMATIC MENOPAUSAL OR FEMALE CLIMACTERIC STATES: ICD-10-CM

## 2024-02-07 RX ORDER — ESTRADIOL 0.04 MG/D
1 PATCH TRANSDERMAL WEEKLY
Qty: 12 PATCH | Refills: 0 | Status: SHIPPED | OUTPATIENT
Start: 2024-02-07

## 2024-02-07 NOTE — PROGRESS NOTES
Patient wished to have brand-name Climara prescription as per her recent note.  Prescription was sent to [facility].

## 2024-04-19 ENCOUNTER — OFFICE VISIT (OUTPATIENT)
Dept: GYNECOLOGY | Facility: CLINIC | Age: 59
End: 2024-04-19
Payer: COMMERCIAL

## 2024-04-19 VITALS
SYSTOLIC BLOOD PRESSURE: 112 MMHG | BODY MASS INDEX: 26.46 KG/M2 | HEIGHT: 60 IN | DIASTOLIC BLOOD PRESSURE: 78 MMHG | WEIGHT: 134.8 LBS

## 2024-04-19 DIAGNOSIS — N95.2 VAGINAL ATROPHY: ICD-10-CM

## 2024-04-19 DIAGNOSIS — N95.1 SYMPTOMATIC MENOPAUSAL OR FEMALE CLIMACTERIC STATES: Primary | ICD-10-CM

## 2024-04-19 DIAGNOSIS — N94.10 DYSPAREUNIA, FEMALE: ICD-10-CM

## 2024-04-19 PROCEDURE — 99213 OFFICE O/P EST LOW 20 MIN: CPT | Performed by: OBSTETRICS & GYNECOLOGY

## 2024-04-19 NOTE — PROGRESS NOTES
Assessment/Plan   Diagnoses and all orders for this visit:    Symptomatic menopausal or female climacteric states    Vaginal atrophy    Dyspareunia, female    1.  Menopausal symptoms-patient was on Climara from 2002 through 2022.  When seen on 8/25/2023, had menopausal symptoms, but was taking Equelle, and OTC soy treatment with okay results.  She then called back on 1/30/2024 inquiring about restarting Climara.  She was started on the medicine is seen today for follow-up visit.  She is doing very well on this medication, with decrease vaginal dryness and much decreased hot flushes.  She wishes to continue with it.  She denies need for prescription at this time, she will call back.  She is interested in brand-name Climara.  We did discuss risk of hormone therapy.  The patient was counseled about the risks of hormone replacement therapy. These included increased risk for breast cancer, blood clots, stroke, and heart disease. She was counseled that recommendations from ACOG and [place] menopause society state that hormone replacement therapy should be used at the lowest dose for the shortest amount of time.In addition, the most recent NAMS statement notes a lack of heart disease risk in those within 10 years of menopause or less than age 60. Also, breast cancer risk in this population does not seem to occur until after 3-5 years of use of hormones. She was counseled about alternatives, including nonhormonal treatment and oral, vaginal, and topical treatment options. After weighing the risks and benefits, she wished to continue with hormonal therapy.  Additionally, we did review the timing hypothesis, which suggest that there may be slight increased risk over age 60 or greater than 10 years from menopause.  She is aware of this and wishes to continue with the product.  2.  Vaginal atrophy with dyspareunia-no longer takes Estrace cream, as she has had good results with the Climara patch.  Recommend use as needed,  continue lubrication and moisturizer as needed  3.  Osteopenia-due for DEXA scan 2025  4.  Status post supracervical hysterectomy with BSO at age 39  5.  Urinary-has noted stress incontinence, denies current symptoms  6. 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  Follow-up 2024 for yearly exam or as needed.    Subjective   Patient ID: Marcelle Ingram is a 59 y.o. female.    Vitals:    24 1404   BP: 112/78     Patient was seen today for follow-up visit.  Please see assessment plan for details.      The following portions of the patient's history were reviewed and updated as appropriate: allergies, current medications, past family history, past medical history, past social history, past surgical history, and problem list.  Past Medical History:   Diagnosis Date    Dental disease     Disease of thyroid gland     Ear problems     Endometriosis      Past Surgical History:   Procedure Laterality Date    ADENOIDECTOMY      NASAL SEPTUM SURGERY      REDUCTION MAMMAPLASTY Bilateral     TOTAL ABDOMINAL HYSTERECTOMY      age 35    TOTAL ABDOMINAL HYSTERECTOMY W/ BILATERAL SALPINGOOPHORECTOMY Bilateral     age 35     OB History    Para Term  AB Living   2 2 2     2   SAB IAB Ectopic Multiple Live Births           2      # Outcome Date GA Lbr Cristo/2nd Weight Sex Delivery Anes PTL Lv   2 Term            1 Term                Current Outpatient Medications:     Climara 0.0375 MG/24HR, Place 1 patch on the skin over 7 days once a week, Disp: 12 patch, Rfl: 0    estradiol (CLIMARA) 0.0375 MG/24HR, Place 1 patch on the skin over 7 days once a week, Disp: 12 patch, Rfl: 1    estradiol (ESTRACE) 0.1 mg/g vaginal cream, Insert 1 g into the vagina 3 (three) times a week, Disp: 42.5 g, Rfl: 1    fluticasone (FLONASE) 50 mcg/act nasal spray, 2 sprays into each nostril, Disp: , Rfl:     gabapentin (Neurontin) 100 mg capsule,  Take 1 capsule (100 mg total) by mouth daily at bedtime, Disp: 30 capsule, Rfl: 6    levothyroxine 25 mcg tablet, Take by mouth, Disp: , Rfl:     naproxen (NAPROSYN) 500 mg tablet, Take 1 tablet (500 mg total) by mouth 2 (two) times a day with meals for 5 days, Disp: 10 tablet, Rfl: 0    triamcinolone (KENALOG) 0.1 % oral topical paste, , Disp: , Rfl:   No Known Allergies  Social History     Socioeconomic History    Marital status: /Civil Union     Spouse name: None    Number of children: None    Years of education: None    Highest education level: None   Occupational History    None   Tobacco Use    Smoking status: Former     Types: Cigarettes    Smokeless tobacco: Never   Vaping Use    Vaping status: Never Used   Substance and Sexual Activity    Alcohol use: Yes     Comment: wine 4 times a week     Drug use: Never    Sexual activity: Yes     Partners: Male   Other Topics Concern    None   Social History Narrative    None     Social Determinants of Health     Financial Resource Strain: Not on file   Food Insecurity: Not on file   Transportation Needs: Not on file   Physical Activity: Not on file   Stress: Not on file   Social Connections: Not on file   Intimate Partner Violence: Not on file   Housing Stability: Not on file     Family History   Problem Relation Age of Onset    Breast cancer Mother 71    No Known Problems Father     No Known Problems Sister     No Known Problems Maternal Grandmother     No Known Problems Maternal Grandfather     No Known Problems Paternal Grandmother     No Known Problems Paternal Grandfather     No Known Problems Sister     No Known Problems Son     No Known Problems Son        Review of Systems   Constitutional:  Negative for chills, diaphoresis, fatigue and fever.   Respiratory:  Negative for apnea, cough, chest tightness, shortness of breath and wheezing.    Cardiovascular:  Negative for chest pain, palpitations and leg swelling.   Gastrointestinal:  Negative for abdominal  distention, abdominal pain, anal bleeding, constipation, diarrhea, nausea, rectal pain and vomiting.   Genitourinary:  Negative for difficulty urinating, dyspareunia, dysuria, frequency, hematuria, menstrual problem, pelvic pain, urgency, vaginal bleeding, vaginal discharge and vaginal pain.   Musculoskeletal:  Negative for arthralgias, back pain and myalgias.   Skin:  Negative for color change and rash.   Neurological:  Negative for dizziness, syncope, light-headedness, numbness and headaches.   Hematological:  Negative for adenopathy. Does not bruise/bleed easily.   Psychiatric/Behavioral:  Negative for dysphoric mood and sleep disturbance. The patient is not nervous/anxious.        Objective   Physical Exam  OBGyn Exam     Objective      /78 (BP Location: Left arm, Patient Position: Sitting, Cuff Size: Standard)   Ht 5' (1.524 m)   Wt 61.1 kg (134 lb 12.8 oz)   BMI 26.33 kg/m²     General:   alert and oriented, in no acute distress   Neck:    Breast:    Heart:    Lungs:    Abdomen: Nontender   Vulva:    Vagina:    Cervix:    Uterus:    Adnexa:    Rectum:     Psych:  Normal mood and affect   Skin:  Without obvious lesions   Eyes: symmetric, with normal movements and reactivity   Musculoskeletal:  Normal muscle tone and movements appreciated

## 2024-08-13 ENCOUNTER — ANNUAL EXAM (OUTPATIENT)
Dept: GYNECOLOGY | Facility: CLINIC | Age: 59
End: 2024-08-13
Payer: COMMERCIAL

## 2024-08-13 VITALS
WEIGHT: 133.6 LBS | SYSTOLIC BLOOD PRESSURE: 124 MMHG | HEIGHT: 61 IN | BODY MASS INDEX: 25.22 KG/M2 | DIASTOLIC BLOOD PRESSURE: 76 MMHG

## 2024-08-13 DIAGNOSIS — Z01.419 WOMEN'S ANNUAL ROUTINE GYNECOLOGICAL EXAMINATION: Primary | ICD-10-CM

## 2024-08-13 DIAGNOSIS — N95.1 SYMPTOMATIC MENOPAUSAL OR FEMALE CLIMACTERIC STATES: ICD-10-CM

## 2024-08-13 DIAGNOSIS — M85.80 OSTEOPENIA, UNSPECIFIED LOCATION: ICD-10-CM

## 2024-08-13 DIAGNOSIS — N95.2 VAGINAL ATROPHY: ICD-10-CM

## 2024-08-13 DIAGNOSIS — Z12.31 ENCOUNTER FOR SCREENING MAMMOGRAM FOR BREAST CANCER: ICD-10-CM

## 2024-08-13 DIAGNOSIS — N64.4 BREAST PAIN: ICD-10-CM

## 2024-08-13 PROCEDURE — S0612 ANNUAL GYNECOLOGICAL EXAMINA: HCPCS | Performed by: OBSTETRICS & GYNECOLOGY

## 2024-08-13 PROCEDURE — G0145 SCR C/V CYTO,THINLAYER,RESCR: HCPCS | Performed by: OBSTETRICS & GYNECOLOGY

## 2024-08-13 RX ORDER — TRETINOIN 0.25 MG/G
CREAM TOPICAL
COMMUNITY
Start: 2024-06-17

## 2024-08-13 RX ORDER — ESTRADIOL 0.04 MG/D
1 PATCH TRANSDERMAL WEEKLY
Qty: 12 PATCH | Refills: 3 | Status: SHIPPED | OUTPATIENT
Start: 2024-08-13

## 2024-08-13 NOTE — PROGRESS NOTES
Assessment & Plan   Diagnoses and all orders for this visit:    Women's annual routine gynecological examination    Encounter for screening mammogram for breast cancer  -     Mammo screening bilateral w 3d & cad; Future    Vaginal atrophy    Breast pain  -     Mammo diagnostic left w cad; Future  -     US breast left limited (diagnostic); Future    Symptomatic menopausal or female climacteric states  -     Climara 0.0375 MG/24HR; Place 1 patch on the skin over 7 days once a week    Osteopenia, unspecified location  -     DXA bone density spine hip and pelvis; Future    Other orders  -     tretinoin (RETIN-A) 0.025 % cream    1. yearly exam-Pap smear done with HPV testing at patient request, self breast awareness reviewed, calcium/vitamin D recommendations discussed, mammogram request given, colonoscopy done August 2021, follow-up as per Dr. Lazaro.  Patient thinks was told 6-year follow-up.  Recommend as per them.  2. menopausal symptoms-on Climara 2002 through 2022.  Off and had severe menopause symptoms.  Saw me in April 2024, restarted on Climara 0.375 and doing well with this.  She is very happy.  She wishes to continue with this.  Electronic prescription for 12 patches refill 3 was sent to mail order pharmacy at her request.  Was previously counseled about risks and benefits at prior visit.  3. history of vaginal atrophy with dyspareunia-denies any current complaints.  She declines need for Estrace cream, has had good results for Climara into the vagina.  4.  Osteopenia-noted from DEXA scan 2023 with risk profile below that for which medical treatment was recommended.  Calcium, vitamin D, weightbearing exercise previously recommended, repeat DEXA scan ordered January 2025.  5. status post supracervical hysterectomy with BSO-done age 39  6.  Prior history of stress incontinence-denies complaints  7.  History of left breast pain-noted this about 2 to 3 weeks ago, lasting 2 days.  It was underneath the nipple area.   She denied any warmth or erythema or mass or discharge.  Exam today is benign.  Patient was counseled about diagnostic imaging and wished to proceed with that.  Left diagnostic mammogram and left breast ultrasound ordered.  If this is okay, she will follow-up with screening mammogram at the appropriate time, request given.  8.  Sleep issues-did take a friend's gummy of marijuana, one quarter of it and it helped her sleep.  She was inquiring about medicinal marijuana.  Suggested she discuss this with her primary doctor.  Also offered sleep study and she will defer at this time.  9.  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  Follow-up 1 year for yearly exam or as needed.    Subjective   Patient ID: Marcelle Ingram is a 59 y.o. female.    Vitals:    24 1550   BP: 124/76     Patient was seen today for yearly exam.  Please see assessment plan for details.        The following portions of the patient's history were reviewed and updated as appropriate: allergies, current medications, past family history, past medical history, past social history, past surgical history, and problem list.  Past Medical History:   Diagnosis Date    Dental disease     Disease of thyroid gland     Ear problems     Endometriosis      Past Surgical History:   Procedure Laterality Date    ADENOIDECTOMY      NASAL SEPTUM SURGERY      REDUCTION MAMMAPLASTY Bilateral     TOTAL ABDOMINAL HYSTERECTOMY      age 35    TOTAL ABDOMINAL HYSTERECTOMY W/ BILATERAL SALPINGOOPHORECTOMY Bilateral     age 35     OB History    Para Term  AB Living   2 2 2     2   SAB IAB Ectopic Multiple Live Births           2      # Outcome Date GA Lbr Cristo/2nd Weight Sex Type Anes PTL Lv   2 Term            1 Term                Current Outpatient Medications:     Climara 0.0375 MG/24HR, Place 1 patch on the skin over 7 days once a week, Disp: 12 patch, Rfl: 3    gabapentin (Neurontin) 100 mg capsule, Take 1 capsule (100 mg total) by mouth  daily at bedtime, Disp: 30 capsule, Rfl: 6    levothyroxine 25 mcg tablet, Take by mouth, Disp: , Rfl:     tretinoin (RETIN-A) 0.025 % cream, , Disp: , Rfl:     estradiol (CLIMARA) 0.0375 MG/24HR, Place 1 patch on the skin over 7 days once a week, Disp: 12 patch, Rfl: 1    fluticasone (FLONASE) 50 mcg/act nasal spray, 2 sprays into each nostril, Disp: , Rfl:     naproxen (NAPROSYN) 500 mg tablet, Take 1 tablet (500 mg total) by mouth 2 (two) times a day with meals for 5 days, Disp: 10 tablet, Rfl: 0    triamcinolone (KENALOG) 0.1 % oral topical paste, , Disp: , Rfl:   No Known Allergies  Social History     Socioeconomic History    Marital status: /Civil Union     Spouse name: None    Number of children: None    Years of education: None    Highest education level: None   Occupational History    None   Tobacco Use    Smoking status: Former     Types: Cigarettes    Smokeless tobacco: Never   Vaping Use    Vaping status: Never Used   Substance and Sexual Activity    Alcohol use: Yes     Comment: wine 4 times a week     Drug use: Never    Sexual activity: Yes     Partners: Male   Other Topics Concern    None   Social History Narrative    None     Social Determinants of Health     Financial Resource Strain: Low Risk  (7/24/2024)    Received from [facility]    Overall Financial Resource Strain (CARDIA)     Difficulty of Paying Living Expenses: Not hard at all   Food Insecurity: No Food Insecurity (7/24/2024)    Received from [facility]    Hunger Vital Sign     Worried About Running Out of Food in the Last Year: Never true     Ran Out of Food in the Last Year: Never true   Transportation Needs: No Transportation Needs (7/24/2024)    Received from [facility]    PRAPARE - Transportation     Lack of Transportation (Medical): No     Lack of Transportation (Non-Medical): No   Physical Activity: Not on file   Stress: No Stress Concern Present (7/24/2024)    Received  from [facility]    [employer] [employer] - Occupational Stress Questionnaire     Feeling of Stress : Only a little   Social Connections: Feeling Socially Integrated (7/24/2024)    Received from [facility]    OASIS : Social Isolation     How often do you feel lonely or isolated from those around you?: Never   Intimate Partner Violence: Not At Risk (7/24/2024)    Received from [facility]    Humiliation, Afraid, Rape, and Kick questionnaire     Fear of Current or Ex-Partner: No     Emotionally Abused: No     Physically Abused: No     Sexually Abused: No   Housing Stability: Low Risk  (7/24/2024)    Received from [facility]    Housing Stability Vital Sign     Unable to Pay for Housing in the Last Year: No     Number of Times Moved in the Last Year: 0     Homeless in the Last Year: No     Family History   Problem Relation Age of Onset    Breast cancer Mother 71    No Known Problems Father     No Known Problems Sister     No Known Problems Maternal Grandmother     No Known Problems Maternal Grandfather     No Known Problems Paternal Grandmother     No Known Problems Paternal Grandfather     No Known Problems Sister     No Known Problems Son     No Known Problems Son        Review of Systems   Constitutional:  Negative for chills, diaphoresis, fatigue and fever.   Respiratory:  Negative for apnea, cough, chest tightness, shortness of breath and wheezing.    Cardiovascular:  Negative for chest pain, palpitations and leg swelling.   Gastrointestinal:  Negative for abdominal distention, abdominal pain, anal bleeding, constipation, diarrhea, nausea, rectal pain and vomiting.   Genitourinary:  Negative for difficulty urinating, dyspareunia, dysuria, frequency, hematuria, menstrual problem, pelvic pain, urgency, vaginal bleeding, vaginal discharge and vaginal pain.   Musculoskeletal:  Negative for arthralgias, back pain and myalgias.  "  Skin:  Negative for color change and rash.   Neurological:  Negative for dizziness, syncope, light-headedness, numbness and headaches.   Hematological:  Negative for adenopathy. Does not bruise/bleed easily.   Psychiatric/Behavioral:  Negative for dysphoric mood and sleep disturbance. The patient is not nervous/anxious.        Objective   Physical Exam  OBGyn Exam     Objective      /76 (BP Location: Right arm, Patient Position: Sitting)   Ht 5' 0.5\" (1.537 m)   Wt 60.6 kg (133 lb 9.6 oz)   BMI 25.66 kg/m²     General:   alert and oriented, in no acute distress   Neck: normal to inspection and palpation   Breast: normal appearance, no masses or tenderness   Heart:    Lungs:    Abdomen: soft, non-tender, without masses or organomegaly   Vulva: normal   Vagina: Without erythema or lesions or discharge.  Normal   Cervix: Without lesions or discharge or cervicitis.  No Cervical motion tenderness   Uterus: surgically absent   Adnexa: no mass, fullness, tenderness   Rectum: negative    Psych:  Normal mood and affect   Skin:  Without obvious lesions   Eyes: symmetric, with normal movements and reactivity   Musculoskeletal:  Normal muscle tone and movements appreciated       "

## 2024-08-21 LAB
LAB AP GYN PRIMARY INTERPRETATION: NORMAL
Lab: NORMAL

## 2024-08-28 DIAGNOSIS — Z11.51 SCREENING FOR HUMAN PAPILLOMAVIRUS (HPV): Primary | ICD-10-CM

## 2024-10-31 ENCOUNTER — HOSPITAL ENCOUNTER (OUTPATIENT)
Dept: MAMMOGRAPHY | Facility: CLINIC | Age: 59
End: 2024-10-31
Payer: COMMERCIAL

## 2024-10-31 VITALS — WEIGHT: 133 LBS | HEIGHT: 60 IN | BODY MASS INDEX: 26.11 KG/M2

## 2024-10-31 DIAGNOSIS — N64.4 BREAST PAIN: ICD-10-CM

## 2024-10-31 PROCEDURE — 77065 DX MAMMO INCL CAD UNI: CPT

## 2024-10-31 PROCEDURE — G0279 TOMOSYNTHESIS, MAMMO: HCPCS

## 2024-10-31 PROCEDURE — 76642 ULTRASOUND BREAST LIMITED: CPT

## 2025-02-03 ENCOUNTER — HOSPITAL ENCOUNTER (OUTPATIENT)
Dept: RADIOLOGY | Age: 60
Discharge: HOME | End: 2025-02-03
Payer: COMMERCIAL

## 2025-02-03 DIAGNOSIS — Z12.31 ENCOUNTER FOR SCREENING MAMMOGRAM FOR BREAST CANCER: ICD-10-CM

## 2025-02-03 PROCEDURE — 77067 SCR MAMMO BI INCL CAD: CPT

## 2025-02-03 PROCEDURE — 77063 BREAST TOMOSYNTHESIS BI: CPT

## 2025-05-19 ENCOUNTER — TELEPHONE (OUTPATIENT)
Age: 60
End: 2025-05-19

## 2025-05-19 NOTE — TELEPHONE ENCOUNTER
"Patient requesting referral for [facility]. 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     Patient states that there is a \"thrive and strive\" program that if you are referred by provider that you can get 3 months for $99.     Routing to provider for review.   "

## 2025-05-20 DIAGNOSIS — M85.80 OSTEOPENIA, UNSPECIFIED LOCATION: Primary | ICD-10-CM

## 2025-05-20 DIAGNOSIS — N95.1 SYMPTOMATIC MENOPAUSAL OR FEMALE CLIMACTERIC STATES: ICD-10-CM

## 2025-05-23 ENCOUNTER — FITNESS (OUTPATIENT)
Age: 60
End: 2025-05-23
Attending: OBSTETRICS & GYNECOLOGY

## 2025-05-23 NOTE — PROGRESS NOTES
Fitness Plan of Care    Referring Provider: Anatoliy Brewer MD  Diagnosis: Osteopenia and symptomatic menopausal or female climacteric states    Risk Factors  Cholesterol: NO  Smoking: NO  HTN: NO  DM: NO  Obesity: NO  Inactivity: NO  Stress: YES    Client specific Goals: Build muscle mass, increase bone density, improve balance and posture, and lose weight.    Lifestyle Assessment Score  LMA Initial Score: 17    Exercise Prescription  Cardiovascular  Frequency: 5-7 days a week  Minutes of Exercise: 15-60   HR:   Modalities: treadmill, bike or elliptical    Strength Training  Frequency: 2-3  Repetitions: 6-8  Sets: 2-3  Modalities: free weights, weight machines, and body weight    Weight PRE: 132 lb 12.8 oz (60.2 kg)     Body fat percentage PRE: 32.6     BMI PRE: 25.9

## 2025-08-12 ENCOUNTER — ANNUAL EXAM (OUTPATIENT)
Dept: GYNECOLOGY | Facility: CLINIC | Age: 60
End: 2025-08-12
Payer: COMMERCIAL